# Patient Record
Sex: MALE | Race: WHITE | Employment: OTHER | URBAN - METROPOLITAN AREA
[De-identification: names, ages, dates, MRNs, and addresses within clinical notes are randomized per-mention and may not be internally consistent; named-entity substitution may affect disease eponyms.]

---

## 2020-11-27 ENCOUNTER — TRANSCRIBE ORDER (OUTPATIENT)
Dept: SCHEDULING | Age: 80
End: 2020-11-27

## 2020-11-27 DIAGNOSIS — I71.9 ANEURYSM OF AORTA (HCC): Primary | ICD-10-CM

## 2020-11-30 ENCOUNTER — TRANSCRIBE ORDER (OUTPATIENT)
Dept: SCHEDULING | Age: 80
End: 2020-11-30

## 2020-11-30 DIAGNOSIS — R42 DIZZINESS: Primary | ICD-10-CM

## 2020-11-30 DIAGNOSIS — I35.9 AORTIC VALVE DISORDER: Primary | ICD-10-CM

## 2020-11-30 DIAGNOSIS — R09.89 BRUIT: Primary | ICD-10-CM

## 2020-12-09 ENCOUNTER — HOSPITAL ENCOUNTER (OUTPATIENT)
Dept: VASCULAR SURGERY | Age: 80
Discharge: HOME | End: 2020-12-09
Attending: NURSE PRACTITIONER
Payer: MEDICARE

## 2020-12-09 VITALS — WEIGHT: 170 LBS | BODY MASS INDEX: 24.34 KG/M2 | HEIGHT: 70 IN

## 2020-12-09 DIAGNOSIS — R09.89 BRUIT: ICD-10-CM

## 2020-12-09 DIAGNOSIS — I35.9 AORTIC VALVE DISORDER: ICD-10-CM

## 2020-12-09 LAB
ECHO AO ROOT DIAM: 3.56 CM
ECHO AV AREA PEAK VELOCITY: 1.26 CM2
ECHO AV AREA VTI: 1.36 CM2
ECHO AV AREA/BSA PEAK VELOCITY: 0.6 CM2/M2
ECHO AV AREA/BSA VTI: 0.7 CM2/M2
ECHO AV MEAN GRADIENT: 11.62 MMHG
ECHO AV MEAN VELOCITY: 159.33 CM/S
ECHO AV PEAK GRADIENT: 21.5 MMHG
ECHO AV PEAK VELOCITY: 231.83 CM/S
ECHO AV REGURGITANT PHT: 436.18 MS
ECHO AV VTI: 50.36 CM
ECHO EST RA PRESSURE: 3 MMHG
ECHO LA VOL 2C: 38.9 ML (ref 18–58)
ECHO LA VOL 4C: 51.22 ML (ref 18–58)
ECHO LA VOL BP: 51.94 ML (ref 18–58)
ECHO LA VOL/BSA BIPLANE: 26.66 ML/M2 (ref 16–28)
ECHO LA VOLUME INDEX A2C: 19.97 ML/M2 (ref 16–28)
ECHO LA VOLUME INDEX A4C: 26.29 ML/M2 (ref 16–28)
ECHO LV E' LATERAL VELOCITY: 50 CM/S
ECHO LV EDV A2C: 103.32 ML
ECHO LV EDV NDEX A2C: 53 ML/M2
ECHO LV EJECTION FRACTION A2C: 71 PERCENT
ECHO LV EJECTION FRACTION A4C: 62 PERCENT
ECHO LV EJECTION FRACTION BIPLANE: 66.5 PERCENT (ref 55–100)
ECHO LV ESV A2C: 18.46 ML
ECHO LV ESV INDEX A2C: 9.5 ML/M2
ECHO LV INTERNAL DIMENSION DIASTOLIC: 4.72 CM (ref 4.2–5.9)
ECHO LV INTERNAL DIMENSION SYSTOLIC: 2.87 CM
ECHO LV IVSD: 1.32 CM (ref 0.6–1)
ECHO LV MASS 2D: 240.8 G (ref 88–224)
ECHO LV MASS INDEX 2D: 123.6 G/M2 (ref 49–115)
ECHO LV POSTERIOR WALL DIASTOLIC: 1.29 CM (ref 0.6–1)
ECHO LVOT DIAM: 2.01 CM
ECHO LVOT PEAK GRADIENT: 3.37 MMHG
ECHO LVOT PEAK VELOCITY: 91.73 CM/S
ECHO LVOT SV: 81.4 ML
ECHO LVOT SV: 81.4 ML
ECHO LVOT VTI: 21.61 CM
ECHO MV A VELOCITY: 86.45 CM/S
ECHO MV AREA PHT: 3.59 CM2
ECHO MV E DECELERATION TIME (DT): 211.2 MS
ECHO MV E VELOCITY: 98.78 CM/S
ECHO MV E/A RATIO: 1.14
ECHO MV E/E' LATERAL: 1.98
ECHO MV PRESSURE HALF TIME (PHT): 61.25 MS
ECHO RA AREA 4C: 14 CM2
ECHO RIGHT VENTRICULAR SYSTOLIC PRESSURE (RVSP): 30 MMHG
ECHO RV INTERNAL DIMENSION: 3.04 CM
ECHO TV REGURGITANT MAX VELOCITY: 261.68 CM/S
ECHO TV REGURGITANT PEAK GRADIENT: 27.39 MMHG
LEFT CCA DIST DIAS: 9 CM/S
LEFT CCA DIST SYS: 96 CM/S
LEFT CCA PROX DIAS: 15 CM/S
LEFT CCA PROX SYS: 118 CM/S
LEFT ECA SYS: 84 CM/S
LEFT ICA DIST DIAS: 12 CM/S
LEFT ICA DIST SYS: 69 CM/S
LEFT ICA MID DIAS: 11 CM/S
LEFT ICA MID SYS: 83 CM/S
LEFT ICA PROX DIAS: 9 CM/S
LEFT ICA PROX SYS: 86 CM/S
LEFT ICA/CCA SYS: 0.89
LEFT SUBCLAVIAN SYS: 110 CM/S
LEFT VERTEBRAL SYS: 50 CM/S
LVOT MG: 1.89 MMHG
RIGHT CCA DIST DIAS: 6 CM/S
RIGHT CCA DIST SYS: 78 CM/S
RIGHT CCA PROX DIAS: 0 CM/S
RIGHT CCA PROX SYS: 81 CM/S
RIGHT ECA SYS: 106 CM/S
RIGHT ICA DIST DIAS: 9 CM/S
RIGHT ICA DIST SYS: 88 CM/S
RIGHT ICA MID DIAS: 11 CM/S
RIGHT ICA MID SYS: 80 CM/S
RIGHT ICA PROX DIAS: 11 CM/S
RIGHT ICA PROX SYS: 80 CM/S
RIGHT ICA/CCA SYS: 1.1
RIGHT SUBCLAVIAN SYS: 100 CM/S
RIGHT VERTEBRAL SYS: 51 CM/S

## 2020-12-09 PROCEDURE — 93880 EXTRACRANIAL BILAT STUDY: CPT

## 2020-12-09 PROCEDURE — 93306 TTE W/DOPPLER COMPLETE: CPT

## 2021-03-04 ENCOUNTER — HOSPITAL ENCOUNTER (OUTPATIENT)
Age: 81
Setting detail: OBSERVATION
Discharge: HOME | End: 2021-03-05
Attending: EMERGENCY MEDICINE | Admitting: INTERNAL MEDICINE
Payer: MEDICARE

## 2021-03-04 ENCOUNTER — APPOINTMENT (OUTPATIENT)
Dept: VASCULAR SURGERY | Age: 81
End: 2021-03-04
Attending: INTERNAL MEDICINE
Payer: MEDICARE

## 2021-03-04 ENCOUNTER — APPOINTMENT (OUTPATIENT)
Dept: GENERAL RADIOLOGY | Age: 81
End: 2021-03-04
Attending: EMERGENCY MEDICINE
Payer: MEDICARE

## 2021-03-04 DIAGNOSIS — I20.9 ANGINA PECTORIS (HCC): Primary | ICD-10-CM

## 2021-03-04 PROBLEM — R07.9 CHEST PAIN: Status: ACTIVE | Noted: 2021-03-04

## 2021-03-04 LAB
ALBUMIN SERPL-MCNC: 3.5 G/DL (ref 3.5–5)
ALBUMIN/GLOB SERPL: 0.8 {RATIO} (ref 1.1–2.2)
ALP SERPL-CCNC: 125 U/L (ref 45–117)
ALT SERPL-CCNC: 13 U/L (ref 12–78)
ANION GAP SERPL CALC-SCNC: 7 MMOL/L (ref 5–15)
AST SERPL W P-5'-P-CCNC: 15 U/L (ref 15–37)
ATRIAL RATE: 61 BPM
BASOPHILS # BLD: 0 K/UL (ref 0–0.2)
BASOPHILS NFR BLD: 0 % (ref 0–2.5)
BILIRUB SERPL-MCNC: 0.4 MG/DL (ref 0.2–1)
BUN SERPL-MCNC: 25 MG/DL (ref 6–20)
BUN/CREAT SERPL: 16 (ref 12–20)
CA-I BLD-MCNC: 9.2 MG/DL (ref 8.5–10.1)
CALCULATED P AXIS, ECG09: 33 DEGREES
CALCULATED R AXIS, ECG10: 58 DEGREES
CALCULATED T AXIS, ECG11: 59 DEGREES
CHLORIDE SERPL-SCNC: 106 MMOL/L (ref 97–108)
CK SERPL-CCNC: 40 U/L (ref 39–308)
CO2 SERPL-SCNC: 27 MMOL/L (ref 21–32)
CREAT SERPL-MCNC: 1.61 MG/DL (ref 0.7–1.3)
DIAGNOSIS, 93000: NORMAL
ECHO LV EDV BP: 50.83 ML (ref 67–155)
ECHO LV EDV BP: 50.83 ML (ref 67–155)
ECHO LV EJECTION FRACTION A2C: 66 PERCENT
ECHO LV EJECTION FRACTION A4C: 60 PERCENT
ECHO LV EJECTION FRACTION BIPLANE: 65.6 PERCENT (ref 55–100)
ECHO LV ESV A2C: 13.11 ML
ECHO LV ESV BP: 17.47 ML (ref 22–58)
ECHO LV ESV BP: 17.47 ML (ref 22–58)
ECHO LV ESV INDEX A2C: 6.7 ML/M2
EOSINOPHIL # BLD: 0.7 K/UL (ref 0–0.7)
EOSINOPHIL NFR BLD: 8 % (ref 0.9–2.9)
ERYTHROCYTE [DISTWIDTH] IN BLOOD BY AUTOMATED COUNT: 14.7 % (ref 11.5–14.5)
GLOBULIN SER CALC-MCNC: 4.3 G/DL (ref 2–4)
GLUCOSE SERPL-MCNC: 101 MG/DL (ref 65–100)
HCT VFR BLD AUTO: 44.2 % (ref 41–53)
HGB BLD-MCNC: 14.8 G/DL (ref 13.5–17.5)
LYMPHOCYTES # BLD: 2.2 K/UL (ref 1–4.8)
LYMPHOCYTES NFR BLD: 27 % (ref 20.5–51.1)
MCH RBC QN AUTO: 30.5 PG (ref 31–34)
MCHC RBC AUTO-ENTMCNC: 33.4 G/DL (ref 31–36)
MCV RBC AUTO: 91.2 FL (ref 80–100)
MONOCYTES # BLD: 0.8 K/UL (ref 0.2–2.4)
MONOCYTES NFR BLD: 10 % (ref 1.7–9.3)
NEUTS SEG # BLD: 4.6 K/UL (ref 1.8–7.7)
NEUTS SEG NFR BLD: 55 % (ref 42–75)
NRBC # BLD: 0.01 K/UL
NRBC BLD-RTO: 0.1 PER 100 WBC
P-R INTERVAL, ECG05: 231 MS
PLATELET # BLD AUTO: 208 K/UL
PMV BLD AUTO: 8.4 FL (ref 6.5–11.5)
POTASSIUM SERPL-SCNC: 3.8 MMOL/L (ref 3.5–5.1)
PROT SERPL-MCNC: 7.8 G/DL (ref 6.4–8.2)
Q-T INTERVAL, ECG07: 462 MS
QRS DURATION, ECG06: 93 MS
QTC CALCULATION (BEZET), ECG08: 466 MS
RBC # BLD AUTO: 4.85 M/UL (ref 4.5–5.9)
SODIUM SERPL-SCNC: 140 MMOL/L (ref 136–145)
TROPONIN I SERPL-MCNC: 0.05 NG/ML
TROPONIN I SERPL-MCNC: 0.06 NG/ML
TROPONIN I SERPL-MCNC: 0.06 NG/ML
TROPONIN I SERPL-MCNC: 0.07 NG/ML
VENTRICULAR RATE, ECG03: 61 BPM
WBC # BLD AUTO: 8.3 K/UL (ref 4.4–11.3)

## 2021-03-04 PROCEDURE — 74011250637 HC RX REV CODE- 250/637: Performed by: NURSE PRACTITIONER

## 2021-03-04 PROCEDURE — 71046 X-RAY EXAM CHEST 2 VIEWS: CPT

## 2021-03-04 PROCEDURE — 99285 EMERGENCY DEPT VISIT HI MDM: CPT

## 2021-03-04 PROCEDURE — 99218 HC RM OBSERVATION: CPT

## 2021-03-04 PROCEDURE — 84484 ASSAY OF TROPONIN QUANT: CPT

## 2021-03-04 PROCEDURE — 93308 TTE F-UP OR LMTD: CPT

## 2021-03-04 PROCEDURE — 96372 THER/PROPH/DIAG INJ SC/IM: CPT

## 2021-03-04 PROCEDURE — 74011250637 HC RX REV CODE- 250/637: Performed by: EMERGENCY MEDICINE

## 2021-03-04 PROCEDURE — 80053 COMPREHEN METABOLIC PANEL: CPT

## 2021-03-04 PROCEDURE — 93005 ELECTROCARDIOGRAM TRACING: CPT

## 2021-03-04 PROCEDURE — 85025 COMPLETE CBC W/AUTO DIFF WBC: CPT

## 2021-03-04 PROCEDURE — 74011250636 HC RX REV CODE- 250/636: Performed by: INTERNAL MEDICINE

## 2021-03-04 PROCEDURE — 82550 ASSAY OF CK (CPK): CPT

## 2021-03-04 PROCEDURE — 74011250637 HC RX REV CODE- 250/637: Performed by: INTERNAL MEDICINE

## 2021-03-04 PROCEDURE — 36415 COLL VENOUS BLD VENIPUNCTURE: CPT

## 2021-03-04 RX ORDER — CALCIUM CARB/MAGNESIUM CARB 311-232MG
5 TABLET ORAL
Status: DISCONTINUED | OUTPATIENT
Start: 2021-03-04 | End: 2021-03-05 | Stop reason: HOSPADM

## 2021-03-04 RX ORDER — GUAIFENESIN 100 MG/5ML
81 LIQUID (ML) ORAL
Status: COMPLETED | OUTPATIENT
Start: 2021-03-04 | End: 2021-03-04

## 2021-03-04 RX ORDER — ASPIRIN 81 MG/1
TABLET ORAL DAILY
COMMUNITY

## 2021-03-04 RX ORDER — PROMETHAZINE HYDROCHLORIDE 25 MG/1
12.5 TABLET ORAL
Status: DISCONTINUED | OUTPATIENT
Start: 2021-03-04 | End: 2021-03-05 | Stop reason: HOSPADM

## 2021-03-04 RX ORDER — CLOPIDOGREL BISULFATE 75 MG/1
75 TABLET ORAL
Status: COMPLETED | OUTPATIENT
Start: 2021-03-04 | End: 2021-03-04

## 2021-03-04 RX ORDER — ASPIRIN 81 MG/1
81 TABLET ORAL DAILY
Status: DISCONTINUED | OUTPATIENT
Start: 2021-03-05 | End: 2021-03-05 | Stop reason: HOSPADM

## 2021-03-04 RX ORDER — ACETAMINOPHEN 650 MG/1
650 SUPPOSITORY RECTAL
Status: DISCONTINUED | OUTPATIENT
Start: 2021-03-04 | End: 2021-03-05 | Stop reason: HOSPADM

## 2021-03-04 RX ORDER — CLOPIDOGREL BISULFATE 75 MG/1
75 TABLET ORAL DAILY
Status: DISCONTINUED | OUTPATIENT
Start: 2021-03-05 | End: 2021-03-05 | Stop reason: HOSPADM

## 2021-03-04 RX ORDER — AMLODIPINE BESYLATE 5 MG/1
5 TABLET ORAL DAILY
COMMUNITY
End: 2022-03-16

## 2021-03-04 RX ORDER — ACETAMINOPHEN 325 MG/1
650 TABLET ORAL
Status: DISCONTINUED | OUTPATIENT
Start: 2021-03-04 | End: 2021-03-05 | Stop reason: HOSPADM

## 2021-03-04 RX ORDER — HEPARIN SODIUM 5000 [USP'U]/ML
5000 INJECTION, SOLUTION INTRAVENOUS; SUBCUTANEOUS EVERY 12 HOURS
Status: DISCONTINUED | OUTPATIENT
Start: 2021-03-04 | End: 2021-03-05 | Stop reason: HOSPADM

## 2021-03-04 RX ORDER — ATORVASTATIN CALCIUM 40 MG/1
40 TABLET, FILM COATED ORAL DAILY
Status: DISCONTINUED | OUTPATIENT
Start: 2021-03-05 | End: 2021-03-05 | Stop reason: HOSPADM

## 2021-03-04 RX ORDER — ONDANSETRON 2 MG/ML
4 INJECTION INTRAMUSCULAR; INTRAVENOUS
Status: DISCONTINUED | OUTPATIENT
Start: 2021-03-04 | End: 2021-03-05 | Stop reason: HOSPADM

## 2021-03-04 RX ORDER — SODIUM CHLORIDE 0.9 % (FLUSH) 0.9 %
5-40 SYRINGE (ML) INJECTION EVERY 8 HOURS
Status: DISCONTINUED | OUTPATIENT
Start: 2021-03-04 | End: 2021-03-05 | Stop reason: HOSPADM

## 2021-03-04 RX ORDER — AMLODIPINE BESYLATE 5 MG/1
5 TABLET ORAL DAILY
Status: DISCONTINUED | OUTPATIENT
Start: 2021-03-05 | End: 2021-03-05 | Stop reason: HOSPADM

## 2021-03-04 RX ORDER — POLYETHYLENE GLYCOL 3350 17 G/17G
17 POWDER, FOR SOLUTION ORAL DAILY PRN
Status: DISCONTINUED | OUTPATIENT
Start: 2021-03-04 | End: 2021-03-05 | Stop reason: HOSPADM

## 2021-03-04 RX ORDER — CLOPIDOGREL BISULFATE 75 MG/1
75 TABLET ORAL DAILY
COMMUNITY

## 2021-03-04 RX ORDER — SODIUM CHLORIDE 0.9 % (FLUSH) 0.9 %
5-40 SYRINGE (ML) INJECTION AS NEEDED
Status: DISCONTINUED | OUTPATIENT
Start: 2021-03-04 | End: 2021-03-05 | Stop reason: HOSPADM

## 2021-03-04 RX ORDER — NITROGLYCERIN 0.4 MG/1
0.4 TABLET SUBLINGUAL AS NEEDED
Status: DISCONTINUED | OUTPATIENT
Start: 2021-03-04 | End: 2021-03-05

## 2021-03-04 RX ORDER — ATORVASTATIN CALCIUM 40 MG/1
40 TABLET, FILM COATED ORAL DAILY
COMMUNITY

## 2021-03-04 RX ORDER — PANTOPRAZOLE SODIUM 40 MG/1
40 TABLET, DELAYED RELEASE ORAL
Status: DISCONTINUED | OUTPATIENT
Start: 2021-03-05 | End: 2021-03-05 | Stop reason: HOSPADM

## 2021-03-04 RX ADMIN — ACETAMINOPHEN 650 MG: 325 TABLET ORAL at 12:40

## 2021-03-04 RX ADMIN — Medication 10 ML: at 14:23

## 2021-03-04 RX ADMIN — Medication 10 ML: at 21:55

## 2021-03-04 RX ADMIN — Medication 5 MG: at 21:55

## 2021-03-04 RX ADMIN — ASPIRIN 81 MG: 81 TABLET, CHEWABLE ORAL at 08:50

## 2021-03-04 RX ADMIN — CLOPIDOGREL BISULFATE 75 MG: 75 TABLET ORAL at 08:50

## 2021-03-04 RX ADMIN — HEPARIN SODIUM 5000 UNITS: 5000 INJECTION INTRAVENOUS; SUBCUTANEOUS at 12:38

## 2021-03-04 RX ADMIN — NITROGLYCERIN 0.4 MG: 0.4 TABLET, ORALLY DISINTEGRATING SUBLINGUAL at 09:55

## 2021-03-04 NOTE — Clinical Note
Patient Class[de-identified] OBSERVATION [104]   Type of Bed: Telemetry [19]   Reason for Observation: trend cardiac biomarkers, NTG PRN   Admitting Diagnosis: Angina pectoris [facility] [[id number]]   Admitting Physician: Vida Payan [[id number]]   Attending Physician: Vida Payan [[id number]]

## 2021-03-04 NOTE — PROGRESS NOTES
Problem: Falls - Risk of  Goal: *Absence of Falls  Description: Document Zandra Suero Fall Risk and appropriate interventions in the flowsheet.   Outcome: Progressing Towards Goal  Note: Fall Risk Interventions:            Medication Interventions: Patient to call before getting OOB                   Problem: Patient Education: Go to Patient Education Activity  Goal: Patient/Family Education  Outcome: Progressing Towards Goal

## 2021-03-04 NOTE — ED TRIAGE NOTES
Pt reports chest pain that began at 3 am. Pt reports slight SOB with exertion. Pt has extensive cardiac history. Pt describes pain as more of a tightness.

## 2021-03-04 NOTE — ED NOTES
Pt reports PRN nitro given at 0955 has helped with the tightness in his chest. Pt has no other complaints or needs at this time. Pt is resting with fiancee at bedside.

## 2021-03-04 NOTE — ED NOTES
House supervisor Nguyen Badillo called stating IP bed 206  will be assigned shortly once hospitalist orders are finalized.

## 2021-03-04 NOTE — ED PROVIDER NOTES
HPI   Patient reports spontaneous onset of dull and aching left substernal chest pain overnight while at rest.  Patient also reports an indigestion sensation with so he took an antacid without relief. Pain worsened with walking and was relieved somewhat by rest.  Pain continued until approximately 1 hour ago prompting patient's presentation. Patient reports that it is still present however much diminished. Patient has a history of pulmonary fibrosis and dyspnea on exertion. This is unchanged. He denies nausea, diaphoresis, new shortness of breath as noted. Patient has an extensive cardiac history including NSTEMI with stent placement but cannot recall if the symptoms today are similar to those. No GI or  complaints. Past Medical History:   Diagnosis Date    Aortic stenosis     Arthritis     Chronic pain     LOWER BACK    GERD (gastroesophageal reflux disease)     Hypertension     Other ill-defined conditions(799.89)     HIGH CHOLESTEROL    Pulmonary fibrosis ([address]     Stroke ([address]     TIA       Past Surgical History:   Procedure Laterality Date    HX GI      COLONOSCOPY    HX HEENT  1997    NOSE FX     HX LUMBAR DISKECTOMY  1970'S    X2    HX ORTHOPAEDIC  2003    LT ANKLE FX W/ SCREWS AND PLATE    HX ORTHOPAEDIC  2009    MAYA.  CARPAL TUNNEL    HX VASCULAR STENT  2019         Family History:   Problem Relation Age of Onset    Stroke Mother     Cancer Father         LEUKEMIA    Cancer Sister         BRAIN TUMOR    COPD Sister     Cancer Sister         KIDNEY       Social History     Socioeconomic History    Marital status:      Spouse name: Not on file    Number of children: Not on file    Years of education: Not on file    Highest education level: Not on file   Occupational History    Not on file   Social Needs    Financial resource strain: Not on file    Food insecurity     Worry: Not on file     Inability: Not on file    Transportation needs     Medical: Not on file Non-medical: Not on file   Tobacco Use    Smoking status: Former Smoker     Years: 40.00    Smokeless tobacco: Never Used   Substance and Sexual Activity    Alcohol use: Yes     Comment: Weekends    Drug use: No    Sexual activity: Not on file   Lifestyle    Physical activity     Days per week: Not on file     Minutes per session: Not on file    Stress: Not on file   Relationships    Social connections     Talks on phone: Not on file     Gets together: Not on file     Attends Restorationism service: Not on file     Active member of club or organization: Not on file     Attends meetings of clubs or organizations: Not on file     Relationship status: Not on file    Intimate partner violence     Fear of current or ex partner: Not on file     Emotionally abused: Not on file     Physically abused: Not on file     Forced sexual activity: Not on file   Other Topics Concern    Not on file   Social History Narrative    Not on file         ALLERGIES: Patient has no known allergies. Review of Systems   Constitutional: Negative. HENT: Negative. Eyes: Negative. Respiratory: Positive for chest tightness and shortness of breath. Cardiovascular: Positive for chest pain. Gastrointestinal: Negative. Endocrine: Negative. Genitourinary: Negative. Musculoskeletal: Negative. Allergic/Immunologic: Negative. Neurological: Negative. Hematological: Negative. Psychiatric/Behavioral: Negative. All other systems reviewed and are negative. Vitals:    03/04/21 0817 03/04/21 0825 03/04/21 0828   BP: (!) 163/79     Pulse: 61     Resp: 20     Temp:   97.9 °F (36.6 °C)   SpO2: 96% 96%    Weight: 77.1 kg (170 lb)     Height: 5' 10\" (1.778 m)              Physical Exam  Vitals signs and nursing note reviewed. Constitutional:       General: He is not in acute distress. Appearance: Normal appearance. He is normal weight. He is not ill-appearing, toxic-appearing or diaphoretic.    HENT:      Head: Normocephalic and atraumatic.      Nose: Nose normal.      Mouth/Throat:      Mouth: Mucous membranes are moist.   Eyes:      Extraocular Movements: Extraocular movements intact.      Pupils: Pupils are equal, round, and reactive to light.   Cardiovascular:      Rate and Rhythm: Normal rate and regular rhythm.      Pulses: Normal pulses.      Heart sounds: Murmur present.      Comments: 3/6 systolic ejection murmur  Pulmonary:      Effort: Pulmonary effort is normal. No respiratory distress.      Breath sounds: No stridor. Rales present. No wheezing or rhonchi.      Comments: Extensive rales worse at bases.  No distress, no other abnormal breath sounds  Chest:      Chest wall: No tenderness.   Abdominal:      General: Abdomen is flat. There is no distension.      Palpations: Abdomen is soft.      Tenderness: There is no abdominal tenderness.   Musculoskeletal: Normal range of motion.         General: No swelling.      Right lower leg: No edema.      Left lower leg: No edema.   Skin:     General: Skin is warm and dry.      Coloration: Skin is not pale.   Neurological:      General: No focal deficit present.      Mental Status: He is alert and oriented to person, place, and time. Mental status is at baseline.      Cranial Nerves: No cranial nerve deficit.      Motor: No weakness.   Psychiatric:         Mood and Affect: Mood normal.         Behavior: Behavior normal.          MDM     History and physical are concerning for ischemia.  EKG is nondiagnostic, however this certainly could be an STEMI.  Patient declined nitro.  Have dosed aspirin and Plavix.  I suspect patient will be admitted.    EKG interpreted by me: Normal sinus rhythm at 61 bpm; first-degree AV block; normal axis; normal T waves; normal ST segments.    Patient reports return of chest tightness.  He has previously declined nitro several times but is amenable to it now.  Initial troponin is 0.06.  Discussed with patient and family that given his ongoing  pain and a nonnegative troponin, I recommend commencement of heparin drip and transfer for likely intervention. Family requests that we wait for second troponin.     Procedures

## 2021-03-04 NOTE — H&P
History & Physical    Primary Care Provider: Polina Newberry MD  Source of Information: Patient     History of Presenting Illness:   Nikki Prasad is a 68 y.o. male who presents with complaints of left sided chest pain described as dull without radiation but associated with shortness of breath. Pain started while patient was laying in bed at about 3:30 AM.  No nausea or vomiting. No cough or Productive sputum. Pain dissipated with sublingual nitroglycerin. By the time patient was examined, he was completely chest pain-free. Twelve-lead EKG shows nonspecific ST-T wave changes and 2 sets of troponin are unremarkable. Will be monitored overnight in the hospital       Review of Systems:  A comprehensive review of systems was negative except for that written in the History of Present Illness. Past Medical History:   Diagnosis Date    Aortic stenosis     Arthritis     Chronic pain     LOWER BACK    GERD (gastroesophageal reflux disease)     Hypertension     Other ill-defined conditions(799.89)     HIGH CHOLESTEROL    Pulmonary fibrosis ([address]     Stroke ([address]     TIA      Past Surgical History:   Procedure Laterality Date    HX GI      COLONOSCOPY    HX HEENT  1997    NOSE FX     HX LUMBAR DISKECTOMY  1970'S    X2    HX ORTHOPAEDIC  2003    LT ANKLE FX W/ SCREWS AND PLATE    HX ORTHOPAEDIC  2009    MAYA. CARPAL TUNNEL    HX VASCULAR STENT  2019     Prior to Admission medications    Medication Sig Start Date End Date Taking? Authorizing Provider   aspirin delayed-release 81 mg tablet Take  by mouth daily. Yes Other, MD Suki   clopidogreL (PLAVIX) 75 mg tab Take  by mouth. Yes Other, MD Suki   atorvastatin (LIPITOR) 40 mg tablet Take  by mouth daily. Yes Other, MD Suki   amLODIPine (NORVASC) 5 mg tablet Take 5 mg by mouth daily. Yes Other, MD Suki   Omeprazole delayed release (PRILOSEC D/R) 20 mg tablet Take 20 mg by mouth daily.       Provider, Historical     No Known Allergies   Family History   Problem Relation Age of Onset    Stroke Mother     Cancer Father         LEUKEMIA    Cancer Sister         BRAIN TUMOR    COPD Sister     Cancer Sister         KIDNEY        SOCIAL HISTORY:  Patient resides:  Independently    Assisted Living    SNF    With family care x      Smoking history:   None    Former x   Chronic      Alcohol history:   None    Social x   Chronic      Ambulates:   Independently x   w/cane    w/walker    w/wc    CODE STATUS:  DNR    Full x   Other      Objective:     Physical Exam:     Visit Vitals  BP (!) 163/78   Pulse (!) 54   Temp 97.9 °F (36.6 °C)   Resp 16   Ht 5' 10\" (1.778 m)   Wt 77.1 kg (170 lb)   SpO2 96%   BMI 24.39 kg/m²      O2 Device: Room air    General:  Alert, cooperative, no distress, appears stated age. Head:  Normocephalic, without obvious abnormality, atraumatic. Eyes:  Conjunctivae/corneas clear. PERRL, EOMs intact. Nose: Nares normal. Septum midline. Mucosa normal. No drainage or sinus tenderness. Throat: Lips, mucosa, and tongue normal. Teeth and gums normal.   Neck: Supple, symmetrical, trachea midline, no adenopathy, thyroid: no enlargement/tenderness/nodules, no carotid bruit and no JVD. Back:   Symmetric, no curvature. ROM normal. No CVA tenderness. Lungs:   Clear to auscultation bilaterally. Chest wall:  No tenderness or deformity. Heart:  Regular rate and rhythm, S1, S2 normal, no murmur, click, rub or gallop. Abdomen:   Soft, non-tender. Bowel sounds normal. No masses,  No organomegaly. Extremities: Extremities normal, atraumatic, no cyanosis or edema. Pulses: 2+ and symmetric all extremities. Skin: Skin color, texture, turgor normal. No rashes or lesions   Neurologic: CNII-XII intact. No motor or sensory deficits. EKG:  normal EKG, normal sinus rhythm, unchanged from previous tracings, nonspecific ST and T waves changes.       Data Review:     Recent Days:  Recent Labs 03/04/21  0840   WBC 8.3   HGB 14.8   HCT 44.2        Recent Labs     03/04/21  0840      K 3.8      CO2 27   *   BUN 25*   CREA 1.61*   CA 9.2   ALB 3.5   TBILI 0.4   ALT 13     No results for input(s): PH, PCO2, PO2, HCO3, FIO2 in the last 72 hours. 24 Hour Results:  Recent Results (from the past 24 hour(s))   EKG, 12 LEAD, INITIAL    Collection Time: 03/04/21  8:19 AM   Result Value Ref Range    Ventricular Rate 61 BPM    Atrial Rate 61 BPM    P-R Interval 231 ms    QRS Duration 93 ms    Q-T Interval 462 ms    QTC Calculation (Bezet) 466 ms    Calculated P Axis 33 degrees    Calculated R Axis 58 degrees    Calculated T Axis 59 degrees    Diagnosis       Sinus rhythm  Prolonged NV interval  Anteroseptal infarct, age indeterminate    Confirmed by [facility] ([id number][facility] on 3/4/2021 10:03:38 AM     CBC WITH AUTOMATED DIFF    Collection Time: 03/04/21  8:40 AM   Result Value Ref Range    WBC 8.3 4.4 - 11.3 K/uL    RBC 4.85 4.50 - 5.90 M/uL    HGB 14.8 13.5 - 17.5 g/dL    HCT 44.2 41 - 53 %    MCV 91.2 80 - 100 FL    MCH 30.5 (L) 31 - 34 PG    MCHC 33.4 31.0 - 36.0 g/dL    RDW 14.7 (H) 11.5 - 14.5 %    PLATELET 987 K/uL    MPV 8.4 6.5 - 11.5 FL    NRBC 0.1  WBC    ABSOLUTE NRBC 0.01 K/uL    NEUTROPHILS 55 42 - 75 %    LYMPHOCYTES 27 20.5 - 51.1 %    MONOCYTES 10 (H) 1.7 - 9.3 %    EOSINOPHILS 8 (H) 0.9 - 2.9 %    BASOPHILS 0 0.0 - 2.5 %    ABS. NEUTROPHILS 4.6 1.8 - 7.7 K/UL    ABS. LYMPHOCYTES 2.2 1.0 - 4.8 K/UL    ABS. MONOCYTES 0.8 0.2 - 2.4 K/UL    ABS. EOSINOPHILS 0.7 0.0 - 0.7 K/UL    ABS.  BASOPHILS 0.0 0.0 - 0.2 K/UL   METABOLIC PANEL, COMPREHENSIVE    Collection Time: 03/04/21  8:40 AM   Result Value Ref Range    Sodium 140 136 - 145 mmol/L    Potassium 3.8 3.5 - 5.1 mmol/L    Chloride 106 97 - 108 mmol/L    CO2 27 21 - 32 mmol/L    Anion gap 7 5 - 15 mmol/L    Glucose 101 (H) 65 - 100 mg/dL    BUN 25 (H) 6 - 20 mg/dL    Creatinine 1.61 (H) 0.70 - 1.30 mg/dL BUN/Creatinine ratio 16 12 - 20      GFR est AA 51 (L) >60 ml/min/1.73m2    GFR est non-AA 42 (L) >60 ml/min/1.73m2    Calcium 9.2 8.5 - 10.1 mg/dL    Bilirubin, total 0.4 0.2 - 1.0 mg/dL    AST (SGOT) 15 15 - 37 U/L    ALT (SGPT) 13 12 - 78 U/L    Alk.  phosphatase 125 (H) 45 - 117 U/L    Protein, total 7.8 6.4 - 8.2 g/dL    Albumin 3.5 3.5 - 5.0 g/dL    Globulin 4.3 (H) 2.0 - 4.0 g/dL    A-G Ratio 0.8 (L) 1.1 - 2.2     CK    Collection Time: 03/04/21  8:40 AM   Result Value Ref Range    CK 40 39 - 308 U/L   TROPONIN I    Collection Time: 03/04/21  8:40 AM   Result Value Ref Range    Troponin-I, Qt. 0.06 (H) <0.05 ng/mL   TROPONIN I    Collection Time: 03/04/21 10:45 AM   Result Value Ref Range    Troponin-I, Qt. 0.05 (H) <0.05 ng/mL         Imaging:   XR CHEST PA LAT   Final Result            Assessment:     Atypical chest pain    -Rule out acute coronary syndrome    -Obtain 2 Sets of cardiac enzymes every 3 hours    -Resume home dose aspirin, statin and Plavix    -Obtain limited 2D echocardiogram.  2D echocardiogram obtained in December showed preserved ejection fraction    Coronary artery disease status post stent    -Fairly stable    -If recurrence of chest pain symptoms, patient may benefit from cardiac catheterization    Hyperlipidemia    -Resume home dose atorvastatin    Gastroesophageal reflux disease    -Resume Protonix 40 mg oral daily      Plan:   Admit to observation telemetry  Plan as above  CODE STATUS discussed with wife daughter and patient  Patient is a full code  Anticipate overnight hospital stay with plan discharge for tomorrow      Signed By: Damir Bates MD     March 4, 2021

## 2021-03-04 NOTE — ED NOTES
Blood taken to lab. Pt refused ordered nitro. Pt accepted aspirin and plavix. Pt has no other needs at this time. Fiance at bedside.

## 2021-03-05 VITALS
DIASTOLIC BLOOD PRESSURE: 60 MMHG | HEIGHT: 70 IN | RESPIRATION RATE: 19 BRPM | BODY MASS INDEX: 24.34 KG/M2 | TEMPERATURE: 97.7 F | SYSTOLIC BLOOD PRESSURE: 105 MMHG | WEIGHT: 170 LBS | OXYGEN SATURATION: 96 % | HEART RATE: 63 BPM

## 2021-03-05 LAB
ANION GAP SERPL CALC-SCNC: 9 MMOL/L (ref 5–15)
BUN SERPL-MCNC: 25 MG/DL (ref 6–20)
BUN/CREAT SERPL: 15 (ref 12–20)
CA-I BLD-MCNC: 9 MG/DL (ref 8.5–10.1)
CHLORIDE SERPL-SCNC: 108 MMOL/L (ref 97–108)
CO2 SERPL-SCNC: 23 MMOL/L (ref 21–32)
CREAT SERPL-MCNC: 1.63 MG/DL (ref 0.7–1.3)
ERYTHROCYTE [DISTWIDTH] IN BLOOD BY AUTOMATED COUNT: 15.1 % (ref 11.5–14.5)
GLUCOSE SERPL-MCNC: 101 MG/DL (ref 65–100)
HCT VFR BLD AUTO: 42.4 % (ref 41–53)
HGB BLD-MCNC: 14 G/DL (ref 13.5–17.5)
MCH RBC QN AUTO: 30.3 PG (ref 31–34)
MCHC RBC AUTO-ENTMCNC: 32.9 G/DL (ref 31–36)
MCV RBC AUTO: 91.9 FL (ref 80–100)
NRBC # BLD: 0.01 K/UL
NRBC BLD-RTO: 0.1 PER 100 WBC
PLATELET # BLD AUTO: 204 K/UL
PMV BLD AUTO: 8.8 FL (ref 6.5–11.5)
POTASSIUM SERPL-SCNC: 4.2 MMOL/L (ref 3.5–5.1)
RBC # BLD AUTO: 4.61 M/UL (ref 4.5–5.9)
SODIUM SERPL-SCNC: 140 MMOL/L (ref 136–145)
TROPONIN I SERPL-MCNC: 0.07 NG/ML
WBC # BLD AUTO: 7.5 K/UL (ref 4.4–11.3)

## 2021-03-05 PROCEDURE — 36415 COLL VENOUS BLD VENIPUNCTURE: CPT

## 2021-03-05 PROCEDURE — 84484 ASSAY OF TROPONIN QUANT: CPT

## 2021-03-05 PROCEDURE — 96372 THER/PROPH/DIAG INJ SC/IM: CPT

## 2021-03-05 PROCEDURE — 80048 BASIC METABOLIC PNL TOTAL CA: CPT

## 2021-03-05 PROCEDURE — 85027 COMPLETE CBC AUTOMATED: CPT

## 2021-03-05 PROCEDURE — 74011250637 HC RX REV CODE- 250/637: Performed by: INTERNAL MEDICINE

## 2021-03-05 PROCEDURE — 74011250636 HC RX REV CODE- 250/636: Performed by: INTERNAL MEDICINE

## 2021-03-05 PROCEDURE — 99218 HC RM OBSERVATION: CPT

## 2021-03-05 RX ORDER — NITROGLYCERIN 0.4 MG/1
0.4 TABLET SUBLINGUAL AS NEEDED
Status: DISCONTINUED | OUTPATIENT
Start: 2021-03-05 | End: 2021-03-05 | Stop reason: HOSPADM

## 2021-03-05 RX ORDER — NITROGLYCERIN 0.4 MG/1
0.4 TABLET SUBLINGUAL AS NEEDED
Qty: 15 TAB | Refills: 0 | Status: SHIPPED | OUTPATIENT
Start: 2021-03-05 | End: 2021-03-10

## 2021-03-05 RX ADMIN — ASPIRIN 81 MG: 81 TABLET, COATED ORAL at 07:59

## 2021-03-05 RX ADMIN — HEPARIN SODIUM 5000 UNITS: 5000 INJECTION INTRAVENOUS; SUBCUTANEOUS at 00:04

## 2021-03-05 RX ADMIN — AMLODIPINE BESYLATE 5 MG: 5 TABLET ORAL at 08:00

## 2021-03-05 RX ADMIN — PANTOPRAZOLE SODIUM 40 MG: 40 TABLET, DELAYED RELEASE ORAL at 07:59

## 2021-03-05 RX ADMIN — CLOPIDOGREL BISULFATE 75 MG: 75 TABLET ORAL at 07:58

## 2021-03-05 RX ADMIN — ATORVASTATIN CALCIUM 40 MG: 40 TABLET, FILM COATED ORAL at 07:59

## 2021-03-05 RX ADMIN — Medication 10 ML: at 05:51

## 2021-03-05 NOTE — DISCHARGE SUMMARY
Physician Discharge Summary     Patient ID:    Samson López  [id number]  30 y.o.  1940    Admit date: 3/4/2021    Discharge date : 3/5/2021    Chronic Diagnoses:    Problem List as of 3/5/2021 Never Reviewed          Codes Class Noted - Resolved    Angina pectoris ([facility] ICD-10-CM: I20.9  ICD-9-CM: 413.9  3/4/2021 - Present        Chest pain ICD-10-CM: R07.9  ICD-9-CM: 786.50  3/4/2021 - Present        Lumbar stenosis ICD-10-CM: M48.061  ICD-9-CM: 724.02  6/19/2012 - Present          22    Final Diagnoses:   Angina pectoris ([facility] [I20.9]  Chest pain [R07.9]    Reason for Hospitalization:  Chest pain        Hospital Course:     72-year-old pleasant gentleman with history of coronary artery disease status post stents admitted to the emergency department with reports of left-sided chest pain. Twelve-lead EKG showed nonspecific ST-T wave changes. Chest pain relieved by sublingual nitroglycerin. He has been chest pain-free overnight. No abnormal arrhythmias. CE Times x3 unremarkable. 2D echocardiogram with well-preserved ejection fraction. He will follow-up outpatient with cardiologist for Cardiolite stress test.  Deemed stable for discharge to home            Discharge Medications:   Current Discharge Medication List      START taking these medications    Details   nitroglycerin (NITROSTAT) 0.4 mg SL tablet 1 Tab by SubLINGual route as needed for Chest Pain for up to 5 days. Up to 3 doses. Qty: 15 Tab, Refills: 0         CONTINUE these medications which have NOT CHANGED    Details   aspirin delayed-release 81 mg tablet Take  by mouth daily. clopidogreL (PLAVIX) 75 mg tab Take  by mouth. atorvastatin (LIPITOR) 40 mg tablet Take  by mouth daily. amLODIPine (NORVASC) 5 mg tablet Take 5 mg by mouth daily. Omeprazole delayed release (PRILOSEC D/R) 20 mg tablet Take 20 mg by mouth daily. Follow up Care:    1. Chante Banerjee MD in 1-2 weeks. Please call to set up an appointment shortly after discharge. Diet:  Cardiac Diet    Disposition:  Home. Advanced Directive:   FULL    DNR      Discharge Exam:  Visit Vitals  BP (!) 109/55   Pulse (!) 56   Temp 97.4 °F (36.3 °C)   Resp 17   Ht 5' 10\" (1.778 m)   Wt 77.1 kg (170 lb)   SpO2 96%   BMI 24.39 kg/m²      O2 Device: Room air    Temp (24hrs), Av.9 °F (36.6 °C), Min:97.4 °F (36.3 °C), Max:98.5 °F (36.9 °C)    No intake/output data recorded.  1901 -  0700  In: 170 [P.O.:150; I.V.:20]  Out: -     General:  Alert, cooperative, no distress, appears stated age. Lungs:   Clear to auscultation bilaterally. Chest wall:  No tenderness or deformity. Heart:  Regular rate and rhythm, S1, S2 normal, no murmur, click, rub or gallop. Abdomen:   Soft, non-tender. Bowel sounds normal. No masses,  No organomegaly. Extremities: Extremities normal, atraumatic, no cyanosis or edema. Pulses: 2+ and symmetric all extremities. Skin: Skin color, texture, turgor normal. No rashes or lesions   Neurologic: CNII-XII intact. No gross sensory or motor deficits         CONSULTATIONS: Cardiology    Significant Diagnostic Studies:   3/4/2021: BUN 25 mg/dL* (Ref range: 6 - 20 mg/dL); Calcium 9.2 mg/dL (Ref range: 8.5 - 10.1 mg/dL); CO2 27 mmol/L (Ref range: 21 - 32 mmol/L); Creatinine 1.61 mg/dL* (Ref range: 0.70 - 1.30 mg/dL); Glucose 101 mg/dL* (Ref range: 65 - 100 mg/dL); HCT 44.2 % (Ref range: 41 - 53 %); HGB 14.8 g/dL (Ref range: 13.5 - 17.5 g/dL); Potassium 3.8 mmol/L (Ref range: 3.5 - 5.1 mmol/L); Sodium 140 mmol/L (Ref range: 136 - 145 mmol/L)  3/5/2021: BUN 25 mg/dL* (Ref range: 6 - 20 mg/dL); Calcium 9.0 mg/dL (Ref range: 8.5 - 10.1 mg/dL); CO2 23 mmol/L (Ref range: 21 - 32 mmol/L); Creatinine 1.63 mg/dL* (Ref range: 0.70 - 1.30 mg/dL); Glucose 101 mg/dL* (Ref range: 65 - 100 mg/dL); HCT 42.4 % (Ref range: 41 - 53 %); HGB 14.0 g/dL (Ref range: 13.5 - 17.5 g/dL);  Potassium 4.2 mmol/L (Ref range: 3.5 - 5.1 mmol/L); Sodium 140 mmol/L (Ref range: 136 - 145 mmol/L)  Recent Labs     03/05/21  0512 03/04/21  0840   WBC 7.5 8.3   HGB 14.0 14.8   HCT 42.4 44.2    208     Recent Labs     03/05/21  0512 03/04/21  0840    140   K 4.2 3.8    106   CO2 23 27   BUN 25* 25*   CREA 1.63* 1.61*   * 101*   CA 9.0 9.2     Recent Labs     03/04/21  0840   ALT 13   *   TBILI 0.4   TP 7.8   ALB 3.5   GLOB 4.3*     No results for input(s): INR, PTP, APTT, INREXT in the last 72 hours. No results for input(s): FE, TIBC, PSAT, FERR in the last 72 hours. No results for input(s): PH, PCO2, PO2 in the last 72 hours.   Recent Labs     03/04/21  0840   CPK 40     No results found for: GLUCPOC    Total Time: 30 minutes    Signed:  Emanuel Sol MD  3/5/2021  9:49 AM

## 2021-03-05 NOTE — CONSULTS
CONSULTATION    REASON FOR CONSULT:  Chest Pain for established [facility]     REQUESTING PROVIDER:  Dr. Jorge Saleem:    Chief Complaint   Patient presents with    Chest Pain         HISTORY OF PRESENT ILLNESS:  Shawn Cisneros is a 68y.o. year-old male with past medical history significant for HTN, CAD s/p PCI LCx 11/2019, Moderate Aortic Stenosis, and Chronic Lung Disease who presented to ED for evaluation of Chest Pain. Patient reports left chest pressure intermittently for the past few weeks. Pain became persistent @ 3am the day of arrival and it persisted, so he presented to ED for further workup given history. No specific aggravating or alleviating factors. Workup in ED was unremarkable, so was referred for further monitoring given continued sx. Records from hospital admission course thus far reviewed. Telemetry Review: SR      PAST MEDICAL HISTORY:    Past Medical History:   Diagnosis Date    Aortic stenosis     Arthritis     Chronic pain     LOWER BACK    GERD (gastroesophageal reflux disease)     Hypertension     Other ill-defined conditions(799.89)     HIGH CHOLESTEROL    Pulmonary fibrosis ([address]     Stroke ([facility] [address]     TIA         HOME MEDICATIONS:    Prior to Admission Medications   Prescriptions Last Dose Informant Patient Reported? Taking? Omeprazole delayed release (PRILOSEC D/R) 20 mg tablet  Self Yes No   Sig: Take 20 mg by mouth daily. amLODIPine (NORVASC) 5 mg tablet   Yes Yes   Sig: Take 5 mg by mouth daily. aspirin delayed-release 81 mg tablet   Yes Yes   Sig: Take  by mouth daily. atorvastatin (LIPITOR) 40 mg tablet   Yes Yes   Sig: Take  by mouth daily. clopidogreL (PLAVIX) 75 mg tab   Yes Yes   Sig: Take  by mouth.       Facility-Administered Medications: None         ALLERGIES:  No Known Allergies      FAMILY HISTORY:    Family History   Problem Relation Age of Onset    Stroke Mother     Cancer Father         LEUKEMIA    Cancer Sister BRAIN TUMOR    COPD Sister     Cancer Sister         KIDNEY         SOCIAL HISTORY:    Tobacco: +  Drugs: denieis  ETOH: occasionally      REVIEW OF SYSTEMS:  Complete review of systems performed, pertinents noted above, all other systems are negative. Patient Vitals for the past 24 hrs:   Temp Pulse Resp BP SpO2   03/05/21 0800  (!) 56      03/05/21 0709 97.4 °F (36.3 °C) (!) 55 17 (!) 109/55 96 %   03/05/21 0427 97.9 °F (36.6 °C) (!) 55 18 (!) 101/56 94 %   03/05/21 0348  (!) 51      03/04/21 2354  (!) 53      03/04/21 2311 98 °F (36.7 °C) (!) 56 18 106/73 95 %   03/04/21 2020 98.5 °F (36.9 °C) (!) 59 18 119/73 92 %   03/04/21 2000  (!) 53      03/04/21 1640 97.9 °F (36.6 °C) (!) 56 18 121/63    03/04/21 1600  (!) 51      03/04/21 1340 97.7 °F (36.5 °C) (!) 57 18 (!) 141/70    03/04/21 1300  63 16 131/66 99 %   03/04/21 1245  72 20 (!) 142/73 93 %   03/04/21 1230  69 22 (!) 143/94 95 %   03/04/21 1215  (!) 52 15 (!) 141/74    03/04/21 1200  (!) 54 16 (!) 163/78 96 %   03/04/21 1145  (!) 49 15 (!) 149/78 99 %   03/04/21 1130  (!) 54 19 (!) 154/91 97 %   03/04/21 1045  (!) 55 14 (!) 144/75 97 %   03/04/21 1030  (!) 54 16 134/67 98 %       PHYSICAL EXAMINATION:    General: Well nourished, NAD, A&O  HEENT: Normocephalic, PERRL, no drainage  Neck: Supple, Trachea midline, No JVD  RESP: CTA bilaterally with symmetrical chest movement. No SOB or distress. On RA  Cardiovascular: RRR no MRG  PVS: No rubor, cyanosis, no edema, Radial, DP, PT pulses equal bilaterally  ABD: flat , soft NT, Normoactive BS  Derm: Warm/Dry/Intact with no lesions, normal turgor  Neuro: A&O PPTS, cranial nerves II- XII grossly intact via interaction with patient. No focal deficits  PSYCH: No anxiety or agitation    Recent labs results and imaging reviewed.       Recent Results (from the past 24 hour(s))   TROPONIN I    Collection Time: 03/04/21 10:45 AM   Result Value Ref Range    Troponin-I, Qt. 0.05 (H) <0.05 ng/mL   ECHO ADULT FOLLOW-UP OR LIMITED    Collection Time: 03/04/21  1:26 PM   Result Value Ref Range    BP EF 65.6 55 - 100 percent    LV Ejection Fraction MOD 2C 66 percent    LV Ejection Fraction MOD 4C 60 percent    LV ED Vol BP 50.83 (A) 67 - 155 mL    LV ED Vol BP 50.83 (A) 67 - 155 mL    LV ES Vol A2C 13.11 mL    LV ES Vol BP 17.47 (A) 22 - 58 mL    LV ES Vol BP 17.47 (A) 22 - 58 mL    LVES Vol Index A2C 6.7 mL/m2   TROPONIN I    Collection Time: 03/04/21  5:45 PM   Result Value Ref Range    Troponin-I, Qt. 0.06 (H) <0.05 ng/mL   TROPONIN I    Collection Time: 03/04/21  6:20 PM   Result Value Ref Range    Troponin-I, Qt. 0.07 (H) <8.23 ng/mL   METABOLIC PANEL, BASIC    Collection Time: 03/05/21  5:12 AM   Result Value Ref Range    Sodium 140 136 - 145 mmol/L    Potassium 4.2 3.5 - 5.1 mmol/L    Chloride 108 97 - 108 mmol/L    CO2 23 21 - 32 mmol/L    Anion gap 9 5 - 15 mmol/L    Glucose 101 (H) 65 - 100 mg/dL    BUN 25 (H) 6 - 20 mg/dL    Creatinine 1.63 (H) 0.70 - 1.30 mg/dL    BUN/Creatinine ratio 15 12 - 20      GFR est AA 50 (L) >60 ml/min/1.73m2    GFR est non-AA 41 (L) >60 ml/min/1.73m2    Calcium 9.0 8.5 - 10.1 mg/dL   CBC W/O DIFF    Collection Time: 03/05/21  5:12 AM   Result Value Ref Range    WBC 7.5 4.4 - 11.3 K/uL    RBC 4.61 4.50 - 5.90 M/uL    HGB 14.0 13.5 - 17.5 g/dL    HCT 42.4 41 - 53 %    MCV 91.9 80 - 100 FL    MCH 30.3 (L) 31 - 34 PG    MCHC 32.9 31.0 - 36.0 g/dL    RDW 15.1 (H) 11.5 - 14.5 %    PLATELET 575 K/uL    MPV 8.8 6.5 - 11.5 FL    NRBC 0.1  WBC    ABSOLUTE NRBC 0.01 K/uL   TROPONIN I    Collection Time: 03/05/21  5:12 AM   Result Value Ref Range    Troponin-I, Qt. 0.07 (H) <0.05 ng/mL           Current Facility-Administered Medications:     nitroglycerin (NITROSTAT) tablet 0.4 mg, 0.4 mg, SubLINGual, PRN, Irina Rangel MD    sodium chloride (NS) flush 5-40 mL, 5-40 mL, IntraVENous, Q8H, Irina Rangel MD, 10 mL at 03/05/21 0551    sodium chloride (NS) flush 5-40 mL, 5-40 mL, IntraVENous, PRN, Bharti Martinez MD    acetaminophen (TYLENOL) tablet 650 mg, 650 mg, Oral, Q6H PRN, 650 mg at 03/04/21 1240 **OR** acetaminophen (TYLENOL) suppository 650 mg, 650 mg, Rectal, Q6H PRN, Bharti Martinez MD    polyethylene glycol (MIRALAX) packet 17 g, 17 g, Oral, DAILY PRN, Bharti Martinez MD    promethazine (PHENERGAN) tablet 12.5 mg, 12.5 mg, Oral, Q6H PRN **OR** ondansetron (ZOFRAN) injection 4 mg, 4 mg, IntraVENous, Q6H PRN, Bharti Martinez MD    heparin (porcine) injection 5,000 Units, 5,000 Units, SubCUTAneous, Q12H, Bharti Martinez MD, 5,000 Units at 03/05/21 0004    amLODIPine (NORVASC) tablet 5 mg, 5 mg, Oral, DAILY, Bharti Martinez MD, 5 mg at 03/05/21 0800    aspirin delayed-release tablet 81 mg, 81 mg, Oral, DAILY, Bharti Martinez MD, 81 mg at 03/05/21 0759    atorvastatin (LIPITOR) tablet 40 mg, 40 mg, Oral, DAILY, Bharti Martinez MD, 40 mg at 03/05/21 0759    clopidogreL (PLAVIX) tablet 75 mg, 75 mg, Oral, DAILY, Bharti Martinez MD, 75 mg at 03/05/21 0758    pantoprazole (PROTONIX) tablet 40 mg, 40 mg, Oral, ACB, Bharti Martinez MD, 40 mg at 03/05/21 0759    melatonin (rapid dissolve) tablet 5 mg, 5 mg, Oral, QHS, Kadi Cesar, NP, 5 mg at 03/04/21 6888          Case discussed with collaborating physician Dr. Ginger Dixon and our impression and recommendations are as follows:   1. Chests Pain - ACS has been ruled out via Troponin and EKG criteria. Given patient's concerning sx will plan to obtain NST in OP setting in the next week. Agree with Rx for SL NTG. Follow-up in clinic after NST. 2. HTN - BP closer to goal. Continue current regimen. 3. CAD: continue DAPT and statin. Plan as above. 4. Moderate AV Stenosis - continue to monitor and medical management as above. Thank you for involving us in the care of this patient. Please do not hesitate to call if additional questions arise.  If after hours please call [phone]

## 2021-03-05 NOTE — ROUTINE PROCESS
GAIL COPE NP FOR ORDERS. PATIENT REQUESTING MEDICATION TO HELP HIM SLEEP. STATES SHE WILL PUT ORDERS IN.

## 2021-03-05 NOTE — PROGRESS NOTES
Problem: Falls - Risk of  Goal: *Absence of Falls  Description: Document Dane Steel Fall Risk and appropriate interventions in the flowsheet.   3/5/2021 0956 by Amaury Wheat RN  Outcome: Resolved/Met  Note: Fall Risk Interventions:            Medication Interventions: Patient to call before getting OOB, Teach patient to arise slowly    Elimination Interventions: Call light in reach           3/5/2021 0728 by Amaury Wheat RN  Outcome: Progressing Towards Goal  Note: Fall Risk Interventions:            Medication Interventions: Patient to call before getting OOB    Elimination Interventions: Call light in reach              Problem: Patient Education: Go to Patient Education Activity  Goal: Patient/Family Education  3/5/2021 0956 by Amaury Wheat RN  Outcome: Resolved/Met  3/5/2021 0728 by Amaury Wheat RN  Outcome: Progressing Towards Goal

## 2021-03-05 NOTE — PROGRESS NOTES
Problem: Falls - Risk of  Goal: *Absence of Falls  Description: Document Robbi Alfaro Fall Risk and appropriate interventions in the flowsheet.   Note: Fall Risk Interventions:            Medication Interventions: Patient to call before getting OOB

## 2021-03-05 NOTE — ROUTINE PROCESS
REPORT GIVEN TO Re Priest. PATIENT REPORTING L SHOULDER PAIN. DENIES CHEST PAIN OR NEED FOR PAIN MEDICATION. RESP EVEN AND UNLABORED WITHOUT DISTRESS.

## 2021-03-05 NOTE — PROGRESS NOTES
Problem: Falls - Risk of  Goal: *Absence of Falls  Description: Document Umm Peralta Fall Risk and appropriate interventions in the flowsheet.   Outcome: Progressing Towards Goal  Note: Fall Risk Interventions:            Medication Interventions: Patient to call before getting OOB    Elimination Interventions: Call light in reach              Problem: Patient Education: Go to Patient Education Activity  Goal: Patient/Family Education  Outcome: Progressing Towards Goal

## 2021-03-23 ENCOUNTER — TRANSCRIBE ORDER (OUTPATIENT)
Dept: SCHEDULING | Age: 81
End: 2021-03-23

## 2021-03-23 DIAGNOSIS — R07.9 CHEST PAIN: Primary | ICD-10-CM

## 2021-04-02 ENCOUNTER — HOSPITAL ENCOUNTER (OUTPATIENT)
Dept: NON INVASIVE DIAGNOSTICS | Age: 81
Discharge: HOME | End: 2021-04-02
Attending: NURSE PRACTITIONER
Payer: MEDICARE

## 2021-04-02 ENCOUNTER — HOSPITAL ENCOUNTER (OUTPATIENT)
Dept: NUCLEAR MEDICINE | Age: 81
Discharge: HOME | End: 2021-04-02
Attending: NURSE PRACTITIONER
Payer: MEDICARE

## 2021-04-02 VITALS — BODY MASS INDEX: 24.05 KG/M2 | HEIGHT: 70 IN | WEIGHT: 168 LBS

## 2021-04-02 DIAGNOSIS — R07.9 CHEST PAIN: ICD-10-CM

## 2021-04-02 LAB
STRESS BASELINE DIAS BP: 75 MMHG
STRESS BASELINE HR: 58 BPM
STRESS BASELINE SYS BP: 133 MMHG
STRESS ESTIMATED WORKLOAD: 1 METS
STRESS EXERCISE DUR MIN: NORMAL MIN:SEC
STRESS PEAK DIAS BP: 76 MMHG
STRESS PEAK SYS BP: 135 MMHG
STRESS PERCENT HR ACHIEVED: 45 %
STRESS POST PEAK HR: 64 BPM
STRESS RATE PRESSURE PRODUCT: 8640 BPM*MMHG
STRESS ST DEPRESSION: 0 MM
STRESS ST ELEVATION: 0 MM
STRESS TARGET HR: 142 BPM

## 2021-04-02 PROCEDURE — 93017 CV STRESS TEST TRACING ONLY: CPT

## 2021-04-02 PROCEDURE — 74011000258 HC RX REV CODE- 258: Performed by: NURSE PRACTITIONER

## 2021-04-02 PROCEDURE — A9500 TC99M SESTAMIBI: HCPCS

## 2021-04-02 PROCEDURE — 74011250636 HC RX REV CODE- 250/636: Performed by: NURSE PRACTITIONER

## 2021-04-02 RX ADMIN — ADENOSINE: 3 INJECTION INTRAVENOUS at 08:51

## 2021-11-30 ENCOUNTER — TRANSCRIBE ORDER (OUTPATIENT)
Dept: REGISTRATION | Age: 81
End: 2021-11-30

## 2021-11-30 ENCOUNTER — HOSPITAL ENCOUNTER (OUTPATIENT)
Dept: LAB | Age: 81
Discharge: HOME | End: 2021-11-30
Payer: MEDICARE

## 2021-11-30 DIAGNOSIS — R25.2 MUSCLE CRAMPS: ICD-10-CM

## 2021-11-30 DIAGNOSIS — I25.10 CAD (CORONARY ARTERY DISEASE): ICD-10-CM

## 2021-11-30 DIAGNOSIS — E78.5 HLD (HYPERLIPIDEMIA): ICD-10-CM

## 2021-11-30 DIAGNOSIS — E78.5 HLD (HYPERLIPIDEMIA): Primary | ICD-10-CM

## 2021-11-30 LAB
ALBUMIN SERPL-MCNC: 3.8 G/DL (ref 3.5–5)
ALBUMIN/GLOB SERPL: 1 {RATIO} (ref 1.1–2.2)
ALP SERPL-CCNC: 109 U/L (ref 45–117)
ALT SERPL-CCNC: 18 U/L (ref 12–78)
ANION GAP SERPL CALC-SCNC: 10 MMOL/L (ref 5–15)
AST SERPL W P-5'-P-CCNC: 19 U/L (ref 15–37)
BASOPHILS # BLD: ABNORMAL K/UL
BASOPHILS NFR BLD: ABNORMAL %
BILIRUB SERPL-MCNC: 0.5 MG/DL (ref 0.2–1)
BUN SERPL-MCNC: 27 MG/DL (ref 6–20)
BUN/CREAT SERPL: 18 (ref 12–20)
CA-I BLD-MCNC: 9 MG/DL (ref 8.5–10.1)
CHLORIDE SERPL-SCNC: 108 MMOL/L (ref 97–108)
CHOLEST SERPL-MCNC: 164 MG/DL
CK SERPL-CCNC: 61.29 U/L (ref 39–308)
CO2 SERPL-SCNC: 26 MMOL/L (ref 21–32)
CREAT SERPL-MCNC: 1.51 MG/DL (ref 0.7–1.3)
DIFFERENTIAL METHOD BLD: ABNORMAL
EOSINOPHIL # BLD: ABNORMAL K/UL
EOSINOPHIL NFR BLD: ABNORMAL %
ERYTHROCYTE [DISTWIDTH] IN BLOOD BY AUTOMATED COUNT: 14.9 % (ref 11.5–14.5)
EST. AVERAGE GLUCOSE BLD GHB EST-MCNC: 103 MG/DL
GLOBULIN SER CALC-MCNC: 3.7 G/DL (ref 2–4)
GLUCOSE SERPL-MCNC: 102 MG/DL (ref 65–100)
HBA1C MFR BLD: 5.2 % (ref 4–5.6)
HCT VFR BLD AUTO: 47.9 % (ref 41–53)
HDLC SERPL-MCNC: 36 MG/DL
HDLC SERPL: 4.6 {RATIO} (ref 0–5)
HGB BLD-MCNC: 16.2 G/DL (ref 13.5–17.5)
IMM GRANULOCYTES # BLD AUTO: ABNORMAL K/UL
IMM GRANULOCYTES NFR BLD AUTO: ABNORMAL %
LDLC SERPL CALC-MCNC: 104.8 MG/DL (ref 0–100)
LIPID PROFILE,FLP: ABNORMAL
LYMPHOCYTES # BLD: ABNORMAL K/UL
LYMPHOCYTES NFR BLD: ABNORMAL %
MAGNESIUM SERPL-MCNC: 2.1 MG/DL (ref 1.6–2.4)
MCH RBC QN AUTO: 31.6 PG (ref 31–34)
MCHC RBC AUTO-ENTMCNC: 33.8 G/DL (ref 31–36)
MCV RBC AUTO: 93.5 FL (ref 80–100)
MONOCYTES # BLD: ABNORMAL K/UL
MONOCYTES NFR BLD: ABNORMAL %
MYOGLOBIN SERPL-MCNC: 60 NG/ML (ref 16–116)
NEUTS SEG # BLD: ABNORMAL K/UL
NEUTS SEG NFR BLD: ABNORMAL %
NRBC # BLD: 0.01 K/UL
NRBC BLD-RTO: 0.2 PER 100 WBC
PLATELET # BLD AUTO: 175 K/UL (ref 150–400)
PMV BLD AUTO: 9 FL (ref 6.5–11.5)
POTASSIUM SERPL-SCNC: 4.1 MMOL/L (ref 3.5–5.1)
PROT SERPL-MCNC: 7.5 G/DL (ref 6.4–8.2)
RBC # BLD AUTO: 5.12 M/UL (ref 4.5–5.9)
SODIUM SERPL-SCNC: 144 MMOL/L (ref 136–145)
TRIGL SERPL-MCNC: 116 MG/DL (ref ?–150)
TSH SERPL DL<=0.05 MIU/L-ACNC: 4.26 UIU/ML (ref 0.36–3.74)
VLDLC SERPL CALC-MCNC: 23.2 MG/DL
WBC # BLD AUTO: 8.3 K/UL (ref 4.4–11.3)

## 2021-11-30 PROCEDURE — 83036 HEMOGLOBIN GLYCOSYLATED A1C: CPT

## 2021-11-30 PROCEDURE — 36415 COLL VENOUS BLD VENIPUNCTURE: CPT

## 2021-11-30 PROCEDURE — 83735 ASSAY OF MAGNESIUM: CPT

## 2021-11-30 PROCEDURE — 80061 LIPID PANEL: CPT

## 2021-11-30 PROCEDURE — 84443 ASSAY THYROID STIM HORMONE: CPT

## 2021-11-30 PROCEDURE — 85025 COMPLETE CBC W/AUTO DIFF WBC: CPT

## 2021-11-30 PROCEDURE — 82550 ASSAY OF CK (CPK): CPT

## 2021-11-30 PROCEDURE — 80053 COMPREHEN METABOLIC PANEL: CPT

## 2021-11-30 PROCEDURE — 83874 ASSAY OF MYOGLOBIN: CPT

## 2021-12-02 ENCOUNTER — TRANSCRIBE ORDER (OUTPATIENT)
Dept: SCHEDULING | Age: 81
End: 2021-12-02

## 2021-12-02 DIAGNOSIS — I35.9 NONRHEUMATIC AORTIC VALVE DISORDER: Primary | ICD-10-CM

## 2022-01-25 ENCOUNTER — HOSPITAL ENCOUNTER (OUTPATIENT)
Dept: VASCULAR SURGERY | Age: 82
Discharge: HOME | End: 2022-01-25
Attending: NURSE PRACTITIONER
Payer: MEDICARE

## 2022-01-25 ENCOUNTER — TRANSCRIBE ORDER (OUTPATIENT)
Dept: REGISTRATION | Age: 82
End: 2022-01-25

## 2022-01-25 DIAGNOSIS — I49.3 PVC'S (PREMATURE VENTRICULAR CONTRACTIONS): ICD-10-CM

## 2022-01-25 DIAGNOSIS — I35.9 NONRHEUMATIC AORTIC VALVE DISORDER: ICD-10-CM

## 2022-01-25 DIAGNOSIS — I49.3 PVC'S (PREMATURE VENTRICULAR CONTRACTIONS): Primary | ICD-10-CM

## 2022-01-25 LAB
ECHO AR MAX VEL PISA: 3.3 M/S
ECHO AV AREA PEAK VELOCITY: 1.4 CM2
ECHO AV AREA PEAK VELOCITY: 1.4 CM2
ECHO AV AREA VTI: 1.3 CM2
ECHO AV AREA VTI: 1.4 CM2
ECHO AV MEAN GRADIENT: 15 MMHG
ECHO AV PEAK GRADIENT: 23 MMHG
ECHO AV PEAK VELOCITY: 2.4 M/S
ECHO AV REGURGITANT PHT: 671.4 MILLISECOND
ECHO AV VTI: 50.6 CM
ECHO LA VOL 2C: 42 ML (ref 18–58)
ECHO LA VOL 2C: 43 ML (ref 18–58)
ECHO LV EDV A2C: 45 ML
ECHO LV EDV A4C: 57 ML
ECHO LV EDV BP: 52 ML (ref 67–155)
ECHO LV EDV BP: 52 ML (ref 67–155)
ECHO LV EJECTION FRACTION A2C: 60 %
ECHO LV EJECTION FRACTION BIPLANE: 55 % (ref 55–100)
ECHO LV EJECTION FRACTION BIPLANE: 55 % (ref 55–100)
ECHO LV ESV A2C: 18 ML
ECHO LV ESV A4C: 29 ML
ECHO LV FRACTIONAL SHORTENING: 34 % (ref 28–44)
ECHO LV INTERNAL DIMENSION DIASTOLIC: 4.1 CM (ref 4.2–5.9)
ECHO LV INTERNAL DIMENSION SYSTOLIC: 2.7 CM
ECHO LV IVSD: 1.3 CM (ref 0.6–1)
ECHO LV MASS 2D: 204.9 G (ref 88–224)
ECHO LV POSTERIOR WALL DIASTOLIC: 1.4 CM (ref 0.6–1)
ECHO LV RELATIVE WALL THICKNESS RATIO: 0.68
ECHO LVOT AREA: 3.5 CM2
ECHO LVOT AV VTI INDEX: 0.39
ECHO LVOT DIAM: 2.1 CM
ECHO LVOT MEAN GRADIENT: 2 MMHG
ECHO LVOT PEAK GRADIENT: 4 MMHG
ECHO LVOT SV: 67.9 ML
ECHO LVOT VTI: 19.6 CM
ECHO MV A VELOCITY: 0.78 M/S
ECHO MV AREA PHT: 3.4 CM2
ECHO MV E DECELERATION TIME (DT): 222.3 MS
ECHO MV E VELOCITY: 0.9 M/S
ECHO MV E/A RATIO: 1.15
ECHO MV PRESSURE HALF TIME (PHT): 64.5 MS
ECHO RA AREA 4C: 12 CM2
ECHO RV INTERNAL DIMENSION: 3.3 CM
ECHO RV TAPSE: 1.7 CM (ref 1.5–2)
ECHO TRICUSPID ANNULAR PEAK SYSTOLIC VELOCITY: 12 CM/S

## 2022-01-25 PROCEDURE — 93306 TTE W/DOPPLER COMPLETE: CPT

## 2022-01-25 PROCEDURE — 93225 XTRNL ECG REC<48 HRS REC: CPT

## 2022-02-01 ENCOUNTER — HOSPITAL ENCOUNTER (OUTPATIENT)
Dept: VASCULAR SURGERY | Age: 82
Discharge: HOME | End: 2022-02-01
Payer: MEDICARE

## 2022-02-01 ENCOUNTER — TRANSCRIBE ORDER (OUTPATIENT)
Dept: REGISTRATION | Age: 82
End: 2022-02-01

## 2022-02-01 DIAGNOSIS — R00.2 PALPITATIONS: ICD-10-CM

## 2022-02-01 DIAGNOSIS — I47.20 V-TACH: Primary | ICD-10-CM

## 2022-02-01 PROCEDURE — 93225 XTRNL ECG REC<48 HRS REC: CPT

## 2022-03-14 ENCOUNTER — APPOINTMENT (OUTPATIENT)
Dept: GENERAL RADIOLOGY | Age: 82
DRG: 196 | End: 2022-03-14
Attending: HOSPITALIST
Payer: MEDICARE

## 2022-03-14 ENCOUNTER — HOSPITAL ENCOUNTER (INPATIENT)
Age: 82
LOS: 2 days | Discharge: HOME | DRG: 196 | End: 2022-03-16
Attending: HOSPITALIST | Admitting: HOSPITALIST
Payer: MEDICARE

## 2022-03-14 DIAGNOSIS — J96.11 CHRONIC RESPIRATORY FAILURE WITH HYPOXIA (HCC): Primary | ICD-10-CM

## 2022-03-14 PROBLEM — J84.10 PULMONARY FIBROSIS (HCC): Status: ACTIVE | Noted: 2022-03-14

## 2022-03-14 PROBLEM — E86.0 DEHYDRATION: Status: ACTIVE | Noted: 2022-03-14

## 2022-03-14 PROBLEM — I95.1 ORTHOSTATIC HYPOTENSION: Status: ACTIVE | Noted: 2022-03-14

## 2022-03-14 LAB
ALBUMIN SERPL-MCNC: 3 G/DL (ref 3.5–5)
ALBUMIN/GLOB SERPL: 0.9 {RATIO} (ref 1.1–2.2)
ALP SERPL-CCNC: 88 U/L (ref 45–117)
ALT SERPL-CCNC: 21 U/L (ref 12–78)
ANION GAP SERPL CALC-SCNC: 9 MMOL/L (ref 5–15)
APPEARANCE UR: CLEAR
ARTERIAL PATENCY WRIST A: ABNORMAL
AST SERPL W P-5'-P-CCNC: 17 U/L (ref 15–37)
BACTERIA URNS QL MICRO: NEGATIVE /HPF
BASE DEFICIT BLDA-SCNC: 1.9 MMOL/L (ref 0–2)
BASOPHILS # BLD: 0 K/UL (ref 0–0.2)
BASOPHILS NFR BLD: 1 % (ref 0–2.5)
BDY SITE: ABNORMAL
BILIRUB SERPL-MCNC: 0.6 MG/DL (ref 0.2–1)
BILIRUB UR QL: NEGATIVE
BNP SERPL-MCNC: 3950 PG/ML
BUN SERPL-MCNC: 21 MG/DL (ref 6–20)
BUN/CREAT SERPL: 15 (ref 12–20)
CA-I BLD-MCNC: 8.6 MG/DL (ref 8.5–10.1)
CHLORIDE SERPL-SCNC: 107 MMOL/L (ref 97–108)
CO2 SERPL-SCNC: 26 MMOL/L (ref 21–32)
COHGB MFR BLD: 0.3 % (ref 0–5)
COLOR UR: ABNORMAL
CREAT SERPL-MCNC: 1.43 MG/DL (ref 0.7–1.3)
EOSINOPHIL # BLD: 0.5 K/UL (ref 0–0.7)
EOSINOPHIL NFR BLD: 5 % (ref 0.9–2.9)
ERYTHROCYTE [DISTWIDTH] IN BLOOD BY AUTOMATED COUNT: 15.8 % (ref 11.5–14.5)
FIO2 ON VENT: 32 %
GAS FLOW.O2 O2 DELIVERY SYS: 3 L/MIN
GLOBULIN SER CALC-MCNC: 3.5 G/DL (ref 2–4)
GLUCOSE SERPL-MCNC: 104 MG/DL (ref 65–100)
GLUCOSE UR STRIP.AUTO-MCNC: NEGATIVE MG/DL
HCO3 BLDA-SCNC: 22 MMOL/L (ref 22–26)
HCT VFR BLD AUTO: 41.4 % (ref 41–53)
HGB BLD OXIMETRY-MCNC: 14.5 G/DL (ref 13.5–17.5)
HGB BLD-MCNC: 13.5 G/DL (ref 13.5–17.5)
HGB UR QL STRIP: NEGATIVE
KETONES UR QL STRIP.AUTO: NEGATIVE MG/DL
LEUKOCYTE ESTERASE UR QL STRIP.AUTO: ABNORMAL
LYMPHOCYTES # BLD: 2 K/UL (ref 1–4.8)
LYMPHOCYTES NFR BLD: 20 % (ref 20.5–51.1)
MAGNESIUM SERPL-MCNC: 2.1 MG/DL (ref 1.6–2.4)
MCH RBC QN AUTO: 29.1 PG (ref 31–34)
MCHC RBC AUTO-ENTMCNC: 32.7 G/DL (ref 31–36)
MCV RBC AUTO: 89 FL (ref 80–100)
METHGB MFR BLD: 0.3 % (ref 0–5)
MONOCYTES # BLD: 0.8 K/UL (ref 0.2–2.4)
MONOCYTES NFR BLD: 8 % (ref 1.7–9.3)
NEUTS SEG # BLD: 6.6 K/UL (ref 1.8–7.7)
NEUTS SEG NFR BLD: 66 % (ref 42–75)
NITRITE UR QL STRIP.AUTO: NEGATIVE
NRBC # BLD: 0 K/UL
NRBC BLD-RTO: 0 PER 100 WBC
OXYHGB MFR BLD: 98 % (ref 95–100)
PCO2 BLDA: 35 MMHG (ref 35–45)
PH BLDA: 7.41 [PH] (ref 7.35–7.45)
PH UR STRIP: 6 [PH] (ref 5–8)
PLATELET # BLD AUTO: 185 K/UL (ref 150–400)
PMV BLD AUTO: 8.2 FL (ref 6.5–11.5)
PO2 BLDA: 117 MMHG (ref 70–100)
POTASSIUM SERPL-SCNC: 4.6 MMOL/L (ref 3.5–5.1)
PROT SERPL-MCNC: 6.5 G/DL (ref 6.4–8.2)
PROT UR STRIP-MCNC: NEGATIVE MG/DL
RBC # BLD AUTO: 4.65 M/UL (ref 4.5–5.9)
RBC #/AREA URNS HPF: ABNORMAL /HPF (ref 0–3)
SAO2% DEVICE SAO2% SENSOR NAME: ABNORMAL
SODIUM SERPL-SCNC: 142 MMOL/L (ref 136–145)
SP GR UR REFRACTOMETRY: 1.01 (ref 1–1.03)
SPECIMEN SITE: ABNORMAL
TSH SERPL DL<=0.05 MIU/L-ACNC: 2.53 UIU/ML (ref 0.36–3.74)
UA: UC IF INDICATED,UAUC: ABNORMAL
UROBILINOGEN UR QL STRIP.AUTO: 0.2 EU/DL (ref 0.2–1)
WBC # BLD AUTO: 9.9 K/UL (ref 4.4–11.3)
WBC URNS QL MICRO: ABNORMAL /HPF (ref 0–5)

## 2022-03-14 PROCEDURE — 94640 AIRWAY INHALATION TREATMENT: CPT

## 2022-03-14 PROCEDURE — 84443 ASSAY THYROID STIM HORMONE: CPT

## 2022-03-14 PROCEDURE — 80053 COMPREHEN METABOLIC PANEL: CPT

## 2022-03-14 PROCEDURE — 84439 ASSAY OF FREE THYROXINE: CPT

## 2022-03-14 PROCEDURE — 87077 CULTURE AEROBIC IDENTIFY: CPT

## 2022-03-14 PROCEDURE — 74011250636 HC RX REV CODE- 250/636: Performed by: HOSPITALIST

## 2022-03-14 PROCEDURE — 93005 ELECTROCARDIOGRAM TRACING: CPT

## 2022-03-14 PROCEDURE — 36415 COLL VENOUS BLD VENIPUNCTURE: CPT

## 2022-03-14 PROCEDURE — 83735 ASSAY OF MAGNESIUM: CPT

## 2022-03-14 PROCEDURE — 82803 BLOOD GASES ANY COMBINATION: CPT

## 2022-03-14 PROCEDURE — 83540 ASSAY OF IRON: CPT

## 2022-03-14 PROCEDURE — 74011000250 HC RX REV CODE- 250: Performed by: HOSPITALIST

## 2022-03-14 PROCEDURE — 83880 ASSAY OF NATRIURETIC PEPTIDE: CPT

## 2022-03-14 PROCEDURE — 87186 SC STD MICRODIL/AGAR DIL: CPT

## 2022-03-14 PROCEDURE — 71045 X-RAY EXAM CHEST 1 VIEW: CPT

## 2022-03-14 PROCEDURE — 65270000029 HC RM PRIVATE

## 2022-03-14 PROCEDURE — 85025 COMPLETE CBC W/AUTO DIFF WBC: CPT

## 2022-03-14 PROCEDURE — 36600 WITHDRAWAL OF ARTERIAL BLOOD: CPT

## 2022-03-14 PROCEDURE — 87086 URINE CULTURE/COLONY COUNT: CPT

## 2022-03-14 PROCEDURE — 81001 URINALYSIS AUTO W/SCOPE: CPT

## 2022-03-14 RX ORDER — ENOXAPARIN SODIUM 100 MG/ML
40 INJECTION SUBCUTANEOUS DAILY
Status: DISCONTINUED | OUTPATIENT
Start: 2022-03-15 | End: 2022-03-16 | Stop reason: HOSPADM

## 2022-03-14 RX ORDER — SODIUM CHLORIDE 0.9 % (FLUSH) 0.9 %
5-40 SYRINGE (ML) INJECTION AS NEEDED
Status: DISCONTINUED | OUTPATIENT
Start: 2022-03-14 | End: 2022-03-16 | Stop reason: HOSPADM

## 2022-03-14 RX ORDER — SODIUM CHLORIDE 9 MG/ML
75 INJECTION, SOLUTION INTRAVENOUS CONTINUOUS
Status: DISCONTINUED | OUTPATIENT
Start: 2022-03-14 | End: 2022-03-16

## 2022-03-14 RX ORDER — ONDANSETRON 2 MG/ML
4 INJECTION INTRAMUSCULAR; INTRAVENOUS
Status: DISCONTINUED | OUTPATIENT
Start: 2022-03-14 | End: 2022-03-16 | Stop reason: HOSPADM

## 2022-03-14 RX ORDER — ATORVASTATIN CALCIUM 40 MG/1
40 TABLET, FILM COATED ORAL DAILY
Status: DISCONTINUED | OUTPATIENT
Start: 2022-03-15 | End: 2022-03-16 | Stop reason: HOSPADM

## 2022-03-14 RX ORDER — ONDANSETRON 4 MG/1
4 TABLET, ORALLY DISINTEGRATING ORAL
Status: DISCONTINUED | OUTPATIENT
Start: 2022-03-14 | End: 2022-03-16 | Stop reason: HOSPADM

## 2022-03-14 RX ORDER — POLYETHYLENE GLYCOL 3350 17 G/17G
17 POWDER, FOR SOLUTION ORAL DAILY PRN
Status: DISCONTINUED | OUTPATIENT
Start: 2022-03-14 | End: 2022-03-16 | Stop reason: HOSPADM

## 2022-03-14 RX ORDER — PANTOPRAZOLE SODIUM 20 MG/1
40 TABLET, DELAYED RELEASE ORAL
Status: DISCONTINUED | OUTPATIENT
Start: 2022-03-15 | End: 2022-03-16 | Stop reason: HOSPADM

## 2022-03-14 RX ORDER — IPRATROPIUM BROMIDE AND ALBUTEROL SULFATE 2.5; .5 MG/3ML; MG/3ML
3 SOLUTION RESPIRATORY (INHALATION)
Status: DISCONTINUED | OUTPATIENT
Start: 2022-03-14 | End: 2022-03-16 | Stop reason: HOSPADM

## 2022-03-14 RX ORDER — MIDODRINE HYDROCHLORIDE 5 MG/1
5 TABLET ORAL 3 TIMES DAILY
Status: ON HOLD | COMMUNITY
End: 2022-03-16 | Stop reason: SDUPTHER

## 2022-03-14 RX ORDER — ASPIRIN 81 MG/1
81 TABLET ORAL DAILY
Status: DISCONTINUED | OUTPATIENT
Start: 2022-03-15 | End: 2022-03-16

## 2022-03-14 RX ORDER — SODIUM CHLORIDE 0.9 % (FLUSH) 0.9 %
5-40 SYRINGE (ML) INJECTION EVERY 8 HOURS
Status: DISCONTINUED | OUTPATIENT
Start: 2022-03-14 | End: 2022-03-16 | Stop reason: HOSPADM

## 2022-03-14 RX ORDER — CLOPIDOGREL BISULFATE 75 MG/1
75 TABLET ORAL DAILY
Status: DISCONTINUED | OUTPATIENT
Start: 2022-03-15 | End: 2022-03-16 | Stop reason: HOSPADM

## 2022-03-14 RX ORDER — ACETAMINOPHEN 325 MG/1
650 TABLET ORAL
Status: DISCONTINUED | OUTPATIENT
Start: 2022-03-14 | End: 2022-03-16 | Stop reason: HOSPADM

## 2022-03-14 RX ORDER — ACETAMINOPHEN 650 MG/1
650 SUPPOSITORY RECTAL
Status: DISCONTINUED | OUTPATIENT
Start: 2022-03-14 | End: 2022-03-16 | Stop reason: HOSPADM

## 2022-03-14 RX ADMIN — IPRATROPIUM BROMIDE AND ALBUTEROL SULFATE 3 ML: .5; 2.5 SOLUTION RESPIRATORY (INHALATION) at 15:28

## 2022-03-14 RX ADMIN — SODIUM CHLORIDE 1000 ML: 9 INJECTION, SOLUTION INTRAVENOUS at 13:34

## 2022-03-14 RX ADMIN — METHYLPREDNISOLONE SODIUM SUCCINATE 60 MG: 125 INJECTION, POWDER, FOR SOLUTION INTRAMUSCULAR; INTRAVENOUS at 15:31

## 2022-03-14 RX ADMIN — IPRATROPIUM BROMIDE AND ALBUTEROL SULFATE 3 ML: .5; 2.5 SOLUTION RESPIRATORY (INHALATION) at 19:57

## 2022-03-14 RX ADMIN — SODIUM CHLORIDE 75 ML/HR: 9 INJECTION, SOLUTION INTRAVENOUS at 15:31

## 2022-03-14 NOTE — ROUTINE PROCESS
Lying /67 HR 83  Sitting BP 97/56 HR 69  Standing /55 HR68  No dizziness. Denies pain.  Pulse ox 100% on O2 nc 3L/m

## 2022-03-14 NOTE — H&P
History and Physical      Chief Complaints:   Sob with exertion and orthostatic hypotension       Subjective:     Jake Singh is a 80 y.o. male followed by Robson Coffman MD and Cardiology Iris March NP and Pulmonologist Dr. Linnette Esquivel has a past medical history of Aortic stenosis, Arthritis, Chronic pain, CAD with stents x 2, GERD (gastroesophageal reflux disease), Hypertension, Pulmonary fibrosis (HCC),copd/emphysema and Stroke ([address] Presents as direct admit from [facility] for orthostatic hypotension. Patient was seen last Friday with hypotension and attempted on Midodrine but patient said it made him have urinary retention. Patient has recently been started on O2 at [facility] after testing that was done by Dr. Gwendolyn Higuera. Patient while at rest does well on the currently 3L NC but with any minimal exertion patient notes extreme sob, dizziness and chest tightness. During this same time frame of 2-3 months patient notes weight loss of about 15lbs and decreased apepitie. Because of noted dyspnea, cardiology did echo in January which showed preserved EF and cardiac catherization showed current stents to be patent and no new blockages noted. Patient also had recent holter monitor which noted at times sinus marcelino with HR as low as 42 but also frequent ectopy and patient was noted to be symptomtatic and was started on Metoprolol dosing which helped but had to be stopped because of hypotension. Patient denies any fevers, cough or sick contacts. Denies chest pain but does complain of chest tightness with exertion. No obvious evidence of fluid overload but BNP was elevated at 3,950. cxr is pending results at this time. Orthostatic vital signs were positive at cardiology office with blood pressure going from 120/60 to 104/60 and 84/60 standing. On arrival to the medical floor ABG was performed on 3L NC and showed good Po2 of 117.  And repeat orthostatics on arrival was Lying BP 134/67 HR 83, Sitting BP 97/56 HR 69  Standing /55 HR68 with no complaint of dizziness or chest pain. Prior to labs, ordered 1 L NS bolus dosing. Labs were similar to most recent check on 11/20/2021. With patient presentation, patient was referred for admission for orthostatic hypotension, weight loss, pulmonary fibrosis exacerbation, chronic hypoxic respiratory failure. Past Medical History:   Diagnosis Date    Aortic stenosis     Arthritis     Chronic pain     LOWER BACK    GERD (gastroesophageal reflux disease)     Hypertension     Other ill-defined conditions(799.89)     HIGH CHOLESTEROL    Pulmonary fibrosis ([address]     St[address] [address]     TIA      Past Surgical History:   Procedure Laterality Date    HX GI      COLONOSCOPY    HX HEENT  1997    NOSE FX     HX LUMBAR DISKECTOMY  1970'S    X2    HX ORTHOPAEDIC  2003    LT ANKLE FX W/ SCREWS AND PLATE    HX ORTHOPAEDIC  2009    MAYA. CARPAL TUNNEL    HX VASCULAR STENT  2019     Family History   Problem Relation Age of Onset    Stroke Mother     Cancer Father         LEUKEMIA    Cancer Sister         BRAIN TUMOR    COPD Sister     Cancer Sister         KIDNEY      Social History     Tobacco Use    Smoking status: Former Smoker     Years: 40.00    Smokeless tobacco: Never Used   Substance Use Topics    Alcohol use: Yes     Comment: Weekends       Prior to Admission medications    Medication Sig Start Date End Date Taking? Authorizing Provider   midodrine (PROAMATINE) 5 mg tablet Take 5 mg by mouth three (3) times daily. Yes Provider, Historical   aspirin delayed-release 81 mg tablet Take  by mouth daily. Yes Other, MD Suki   clopidogreL (PLAVIX) 75 mg tab Take 75 mg by mouth daily. Yes Other, MD Suki   atorvastatin (LIPITOR) 40 mg tablet Take 40 mg by mouth daily. Yes Soraya, MD Suki   amLODIPine (NORVASC) 5 mg tablet Take 5 mg by mouth daily.    Yes Suki Lira MD   Omeprazole delayed release (PRILOSEC D/R) 20 mg tablet Take 20 mg by mouth daily. Yes Provider, Historical     No Known Allergies     Review of Systems:  Review of Systems   Constitutional: Negative for chills, diaphoresis, fatigue and fever. HENT: Negative for congestion, ear pain, postnasal drip, sinus pain and sore throat. Eyes: Negative for pain, discharge and redness. Respiratory: Positive for shortness of breath. Negative for cough and wheezing. Cardiovascular: Positive for palpitations. Negative for chest pain. Gastrointestinal: Negative for abdominal pain, constipation, diarrhea, nausea and vomiting. Genitourinary: Negative for dysuria, flank pain, frequency and urgency. Musculoskeletal: Negative for arthralgias and myalgias. Neurological: Negative for dizziness, weakness and headaches. Psychiatric/Behavioral: Negative for agitation and hallucinations. The patient is not nervous/anxious. Objective:     Vitals:  Visit Vitals  BP (!) 119/53   Pulse 73   Temp 97.3 °F (36.3 °C)   Resp 18   Ht 5' 9\" (1.753 m)   Wt 72.6 kg (160 lb)   SpO2 97%   BMI 23.63 kg/m²       Physical Exam:  General: Alert, cooperative, no distress. Head:  Normocephalic, without obvious abnormality, atraumatic. Eyes:  Conjunctivae/corneas clear. Pupils equal, round, reactive to light. Extraocular movements intact. Lungs:  B/l air entry diminished with no wheeze but diffuse fine crackles noted  Chest wall: No tenderness or deformity. Heart:  Regular rate and rhythm, S1, S2 normal, no murmur, click, rub, or gallop. Abdomen:   Soft, non-tender. Bowel sounds normal. No masses. No organomegaly. Back:  No spine tenderness to palpation  Extremities: Extremities normal, atraumatic, no cyanosis or edema. Pulses: Symmetric all extremities. Skin: Skin color, texture, turgor normal.   Lymph nodes: Cervical nodes normal.  Neurologic: CNII-XII intact. Normal strength, sensation, and reflexes throughout.       Labs:  Recent Results (from the past 24 hour(s))   BLOOD GAS, ARTERIAL    Collection Time: 03/14/22  1:35 PM   Result Value Ref Range    pH 7.41 7.35 - 7.45      PCO2 35 35 - 45 mmHg    PO2 117 (H) 70 - 100 mmHg    BICARBONATE 22 22 - 26 mmol/L    BASE DEFICIT 1.9 0 - 2 mmol/L    O2 METHOD Nasal Cannula      O2 FLOW RATE 3 L/min    FIO2 32.0 %    Sample source Arterial      SITE Right Brachial      MARCIN'S TEST Not Applicable      Carboxy-Hgb 0.3 0 - 5 %    Methemoglobin 0.3 0 - 5 %    tHb 14.5 13.5 - 17.5 g/dL    Oxyhemoglobin 98.0 95 - 992 %   METABOLIC PANEL, COMPREHENSIVE    Collection Time: 03/14/22  1:56 PM   Result Value Ref Range    Sodium 142 136 - 145 mmol/L    Potassium 4.6 3.5 - 5.1 mmol/L    Chloride 107 97 - 108 mmol/L    CO2 26 21 - 32 mmol/L    Anion gap 9 5 - 15 mmol/L    Glucose 104 (H) 65 - 100 mg/dL    BUN 21 (H) 6 - 20 mg/dL    Creatinine 1.43 (H) 0.70 - 1.30 mg/dL    BUN/Creatinine ratio 15 12 - 20      GFR est AA 58 (L) >60 ml/min/1.73m2    GFR est non-AA 48 (L) >60 ml/min/1.73m2    Calcium 8.6 8.5 - 10.1 mg/dL    Bilirubin, total 0.6 0.2 - 1.0 mg/dL    AST (SGOT) 17 15 - 37 U/L    ALT (SGPT) 21 12 - 78 U/L    Alk. phosphatase 88 45 - 117 U/L    Protein, total 6.5 6.4 - 8.2 g/dL    Albumin 3.0 (L) 3.5 - 5.0 g/dL    Globulin 3.5 2.0 - 4.0 g/dL    A-G Ratio 0.9 (L) 1.1 - 2.2     CBC WITH AUTOMATED DIFF    Collection Time: 03/14/22  1:56 PM   Result Value Ref Range    WBC 9.9 4.4 - 11.3 K/uL    RBC 4.65 4.50 - 5.90 M/uL    HGB 13.5 13.5 - 17.5 g/dL    HCT 41.4 41 - 53 %    MCV 89.0 80 - 100 FL    MCH 29.1 (L) 31 - 34 PG    MCHC 32.7 31.0 - 36.0 g/dL    RDW 15.8 (H) 11.5 - 14.5 %    PLATELET 024 800 - 543 K/uL    MPV 8.2 6.5 - 11.5 FL    NRBC 0.0  WBC    ABSOLUTE NRBC 0.00 K/uL    NEUTROPHILS 66 42 - 75 %    LYMPHOCYTES 20 (L) 20.5 - 51.1 %    MONOCYTES 8 1.7 - 9.3 %    EOSINOPHILS 5 (H) 0.9 - 2.9 %    BASOPHILS 1 0.0 - 2.5 %    ABS. NEUTROPHILS 6.6 1.8 - 7.7 K/UL    ABS. LYMPHOCYTES 2.0 1.0 - 4.8 K/UL    ABS. MONOCYTES 0.8 0.2 - 2.4 K/UL    ABS. EOSINOPHILS 0.5 0.0 - 0.7 K/UL    ABS. BASOPHILS 0.0 0.0 - 0.2 K/UL   MAGNESIUM    Collection Time: 03/14/22  1:56 PM   Result Value Ref Range    Magnesium 2.1 1.6 - 2.4 mg/dL   NT-PRO BNP    Collection Time: 03/14/22  1:56 PM   Result Value Ref Range    NT pro-BNP 3,950 (H) <450 pg/mL       Imaging:  No results found.      Assessment & Plan:      Orthostatic Hypotension   - was positive at cardiology office and felt to be secondary to poor po intake / dehydration / weight loss   - monitor daily orthostatic pressures and vital signs every 4 hours  -Monitor on telemetry for evidences of activity  -1 L fluid bolus of normal saline and will continue normal saline at 75 an hour  -Initial BUN/creatinine 21/1.43 and follow closely  -Mag level is normal  -No evidence of fluid overload but BNP is at 3950  -Fall precautions  -TSH high mildly elevated at 4.26 on 11/30 will obtain repeat TSH and free T4 levels  -Recent 2D echo in January 2022 normal left ventricular size with preserved EF of 8969% diastolic dysfunction present with normal EF  -Recent cardiac catheterization and no new significant blockages and current stents are patent  -Cardiology evaluation    Acute on chronic hypoxic respiratory failure  -Patient recently started on 3 L nasal cannula continuous but continued to have shortness of breath with minimal exertion  -Follows with Dr. Sky Suárez  pulmonologist in [facility]  -chest xray showing reduced lung volumes with dense peripheral honeycombing reticular markings to the lungs evidence for pulmonary fibrosis  -Obtain D-dimer in a.m.  -May benefit CTA of chest in a.m. tomorrow evaluate for PE and possible neoplasm secondary to noted weight loss and poor p.o. intake  - ABG on 3L NC shows a pH of 7.41/CO2 of 35/PO2 of 117/HCO3 22  -Continue DuoNebs 4 times daily  -Continuous bedside O2 monitoring evaluate for any hypoxia while asleep  -We will need walking pulse ox testing prior to discharge    Acute on chronic pulmonary fibrosis exacerbation  -Has history of noted to have decreased air entry but with any exertion gets short of breath and chest tightness but denies any cough  -We will place on DuoNeb 4 times daily  -Start Solu-Medrol 60 mg IV every 12 hours  - chest xray showing reduced lung volumes with dense peripheral honeycombing reticular markings to the lungs evidence for pulmonary fibrosis  -Encourage incentive spirometry    Urinary retention, urgency , frequency  - states he has been having symptoms since midodrine started   - obtain analysis and urine culture      Dehydration:  -With poor p.o. intake and noted orthostatic hypotension at cardiology office  -15 pound weight loss recently  -Status post 1 L fluid bolus continue normal saline at 75 an hour  -BUN/creatinine of 21/1.43  -Monitor input output and daily weights  -Follow repeat labs in a.m.     Poor p.o. intake/weight loss  -Over last 2 3 months with decreased appetite  -Nutrition consultation for oral nutritional supplements  -Monitor p.o. intake and daily weights    CAD history of stent  -Continue current aspirin Plavix and statin therapy  -Monitor on telemetry has recent Holter shows episodes of ectopy and initially was placed on metoprolol but held secondary to hypotension  -History of recent cardiac catheterization showing patency of current stents and no new blockages  -Echo shows preserved EF    GI Prophylaxis  Continue patient's home PPI    DVT prophylaxis  -Lovenox    CODE STATUS: Full code  Patient designates his daughter Alexy Cotter as his medical decision-maker if for some reason he is unable to make decision for himself    Spent 50 minutes evaluating cording patient's admission to acute remote telemetry and expect at least 2 to 3 days of acute hospitalization      Electronically signed by Sarah Taylor MD on 3/14/2022 at 3:06 PM

## 2022-03-14 NOTE — ACP (ADVANCE CARE PLANNING)
Advance Care Planning   Healthcare Decision Maker:       Primary Decision Maker: Kaveh Me - Daughter - [phone]    Click here to complete [address] including selection of the Healthcare Decision Maker Relationship (ie \"Primary\")

## 2022-03-14 NOTE — PROGRESS NOTES
Care Management Interventions  PCP Verified by CM: Yes  Mode of Transport at Discharge: Other (see comment) (Family member)  Transition of Care Consult (CM Consult): Discharge Planning  Support Systems: Spouse/Significant Other,Child(cody)  Confirm Follow Up Transport: Family  The Plan for Transition of Care is Related to the Following Treatment Goals : Patient lives with his significant other. Also has support from his daughter. No discharge planning needs identified at this time.   Discharge Location  Patient Expects to be Discharged to[de-identified] Home with family assistance

## 2022-03-14 NOTE — ROUTINE PROCESS
Pt admitted to room 205. IV site started to [facility] IVF bolus infusing. EKG done. Instructed need urine specimen, states understanding. Pt [place]. O2 on per nc no resp distress noted.  Lab in at present

## 2022-03-15 ENCOUNTER — APPOINTMENT (OUTPATIENT)
Dept: CT IMAGING | Age: 82
DRG: 196 | End: 2022-03-15
Attending: HOSPITALIST
Payer: MEDICARE

## 2022-03-15 PROBLEM — I49.3 VENTRICULAR ECTOPY: Status: ACTIVE | Noted: 2022-03-15

## 2022-03-15 PROBLEM — I25.10 CAD (CORONARY ARTERY DISEASE): Status: ACTIVE | Noted: 2022-03-15

## 2022-03-15 LAB
ANION GAP SERPL CALC-SCNC: 9 MMOL/L (ref 5–15)
BASOPHILS # BLD: 0 K/UL (ref 0–0.2)
BASOPHILS NFR BLD: 0 % (ref 0–2.5)
BUN SERPL-MCNC: 19 MG/DL (ref 6–20)
BUN/CREAT SERPL: 14 (ref 12–20)
CA-I BLD-MCNC: 8.6 MG/DL (ref 8.5–10.1)
CHLORIDE SERPL-SCNC: 108 MMOL/L (ref 97–108)
CO2 SERPL-SCNC: 24 MMOL/L (ref 21–32)
CREAT SERPL-MCNC: 1.38 MG/DL (ref 0.7–1.3)
D DIMER PPP FEU-MCNC: 0.44 UG/ML(FEU)
EOSINOPHIL # BLD: 0 K/UL (ref 0–0.7)
EOSINOPHIL NFR BLD: 0 % (ref 0.9–2.9)
ERYTHROCYTE [DISTWIDTH] IN BLOOD BY AUTOMATED COUNT: 15.7 % (ref 11.5–14.5)
GLUCOSE SERPL-MCNC: 142 MG/DL (ref 65–100)
HCT VFR BLD AUTO: 38.1 % (ref 41–53)
HGB BLD-MCNC: 12.4 G/DL (ref 13.5–17.5)
IRON SATN MFR SERPL: 29 % (ref 20–50)
IRON SERPL-MCNC: 72 UG/DL (ref 35–150)
LYMPHOCYTES # BLD: 1.2 K/UL (ref 1–4.8)
LYMPHOCYTES NFR BLD: 13 % (ref 20.5–51.1)
MCH RBC QN AUTO: 28.9 PG (ref 31–34)
MCHC RBC AUTO-ENTMCNC: 32.5 G/DL (ref 31–36)
MCV RBC AUTO: 89.2 FL (ref 80–100)
MONOCYTES # BLD: 0.3 K/UL (ref 0.2–2.4)
MONOCYTES NFR BLD: 3 % (ref 1.7–9.3)
NEUTS SEG # BLD: 7.7 K/UL (ref 1.8–7.7)
NEUTS SEG NFR BLD: 84 % (ref 42–75)
NRBC # BLD: 0 K/UL
NRBC BLD-RTO: 0 PER 100 WBC
PLATELET # BLD AUTO: 167 K/UL (ref 150–400)
PMV BLD AUTO: 9 FL (ref 6.5–11.5)
POTASSIUM SERPL-SCNC: 4.6 MMOL/L (ref 3.5–5.1)
RBC # BLD AUTO: 4.27 M/UL (ref 4.5–5.9)
SODIUM SERPL-SCNC: 141 MMOL/L (ref 136–145)
T4 FREE SERPL-MCNC: 0.7 NG/DL (ref 0.8–1.5)
TIBC SERPL-MCNC: 246 UG/DL (ref 250–450)
WBC # BLD AUTO: 9.3 K/UL (ref 4.4–11.3)

## 2022-03-15 PROCEDURE — 80048 BASIC METABOLIC PNL TOTAL CA: CPT

## 2022-03-15 PROCEDURE — 71260 CT THORAX DX C+: CPT

## 2022-03-15 PROCEDURE — 85025 COMPLETE CBC W/AUTO DIFF WBC: CPT

## 2022-03-15 PROCEDURE — 74011250636 HC RX REV CODE- 250/636: Performed by: HOSPITALIST

## 2022-03-15 PROCEDURE — 94761 N-INVAS EAR/PLS OXIMETRY MLT: CPT

## 2022-03-15 PROCEDURE — 65270000029 HC RM PRIVATE

## 2022-03-15 PROCEDURE — 74011000250 HC RX REV CODE- 250: Performed by: HOSPITALIST

## 2022-03-15 PROCEDURE — 74011000636 HC RX REV CODE- 636: Performed by: HOSPITALIST

## 2022-03-15 PROCEDURE — 94762 N-INVAS EAR/PLS OXIMTRY CONT: CPT

## 2022-03-15 PROCEDURE — 77010033678 HC OXYGEN DAILY

## 2022-03-15 PROCEDURE — 74011250637 HC RX REV CODE- 250/637: Performed by: HOSPITALIST

## 2022-03-15 PROCEDURE — 51798 US URINE CAPACITY MEASURE: CPT

## 2022-03-15 PROCEDURE — 94640 AIRWAY INHALATION TREATMENT: CPT

## 2022-03-15 PROCEDURE — 85379 FIBRIN DEGRADATION QUANT: CPT

## 2022-03-15 RX ORDER — DILTIAZEM HYDROCHLORIDE 120 MG/1
120 CAPSULE, COATED, EXTENDED RELEASE ORAL DAILY
Status: DISCONTINUED | OUTPATIENT
Start: 2022-03-15 | End: 2022-03-16

## 2022-03-15 RX ORDER — ALPRAZOLAM 0.5 MG/1
0.5 TABLET ORAL
Status: DISCONTINUED | OUTPATIENT
Start: 2022-03-15 | End: 2022-03-16 | Stop reason: HOSPADM

## 2022-03-15 RX ORDER — MIDODRINE HYDROCHLORIDE 5 MG/1
10 TABLET ORAL
Status: DISCONTINUED | OUTPATIENT
Start: 2022-03-15 | End: 2022-03-15

## 2022-03-15 RX ORDER — MIDODRINE HYDROCHLORIDE 5 MG/1
10 TABLET ORAL
Status: DISCONTINUED | OUTPATIENT
Start: 2022-03-15 | End: 2022-03-16

## 2022-03-15 RX ADMIN — SODIUM CHLORIDE 75 ML/HR: 9 INJECTION, SOLUTION INTRAVENOUS at 21:11

## 2022-03-15 RX ADMIN — ALPRAZOLAM 0.5 MG: 0.5 TABLET ORAL at 22:12

## 2022-03-15 RX ADMIN — ATORVASTATIN CALCIUM 40 MG: 40 TABLET, FILM COATED ORAL at 08:32

## 2022-03-15 RX ADMIN — METHYLPREDNISOLONE SODIUM SUCCINATE 60 MG: 125 INJECTION, POWDER, FOR SOLUTION INTRAMUSCULAR; INTRAVENOUS at 08:32

## 2022-03-15 RX ADMIN — IOPAMIDOL 100 ML: 755 INJECTION, SOLUTION INTRAVENOUS at 09:24

## 2022-03-15 RX ADMIN — MIDODRINE HYDROCHLORIDE 10 MG: 5 TABLET ORAL at 16:58

## 2022-03-15 RX ADMIN — PANTOPRAZOLE SODIUM 40 MG: 20 TABLET, DELAYED RELEASE ORAL at 07:29

## 2022-03-15 RX ADMIN — SODIUM CHLORIDE, PRESERVATIVE FREE 10 ML: 5 INJECTION INTRAVENOUS at 13:32

## 2022-03-15 RX ADMIN — ALPRAZOLAM 0.5 MG: 0.5 TABLET ORAL at 15:49

## 2022-03-15 RX ADMIN — ENOXAPARIN SODIUM 40 MG: 100 INJECTION SUBCUTANEOUS at 08:32

## 2022-03-15 RX ADMIN — IPRATROPIUM BROMIDE AND ALBUTEROL SULFATE 3 ML: .5; 2.5 SOLUTION RESPIRATORY (INHALATION) at 15:15

## 2022-03-15 RX ADMIN — IPRATROPIUM BROMIDE AND ALBUTEROL SULFATE 3 ML: .5; 2.5 SOLUTION RESPIRATORY (INHALATION) at 11:28

## 2022-03-15 RX ADMIN — DILTIAZEM HYDROCHLORIDE 120 MG: 120 CAPSULE, COATED, EXTENDED RELEASE ORAL at 11:15

## 2022-03-15 RX ADMIN — IPRATROPIUM BROMIDE AND ALBUTEROL SULFATE 3 ML: .5; 2.5 SOLUTION RESPIRATORY (INHALATION) at 07:44

## 2022-03-15 RX ADMIN — CLOPIDOGREL BISULFATE 75 MG: 75 TABLET ORAL at 08:32

## 2022-03-15 RX ADMIN — SODIUM CHLORIDE 75 ML/HR: 9 INJECTION, SOLUTION INTRAVENOUS at 05:00

## 2022-03-15 RX ADMIN — MIDODRINE HYDROCHLORIDE 10 MG: 5 TABLET ORAL at 09:50

## 2022-03-15 RX ADMIN — IPRATROPIUM BROMIDE AND ALBUTEROL SULFATE 3 ML: .5; 2.5 SOLUTION RESPIRATORY (INHALATION) at 19:46

## 2022-03-15 RX ADMIN — ASPIRIN 81 MG: 81 TABLET, COATED ORAL at 08:32

## 2022-03-15 NOTE — CONSULTS
CONSULTATION    REASON FOR CONSULT:  Dyspnea and Orthostatic Hypotension for established [facility]     REQUESTING PROVIDER:  Dr. Eva Bunn:    Weakness, Dyspnea, Hypoxia      HISTORY OF PRESENT ILLNESS:  Sri Mas is a 80y.o. year-old male with past medical history significant for HTN, CAD s/p PCI LCx 11/2019, Mild Aortic Stenosis, Emphysema, Severe Pulmonary Fibrosis, and chronic hypoxia who is now dependent on 3L o2 via NC continuously who presented to [facility] with complaints of weakness, palpitations, and hypotension. Over the past 2 months patient has had increase in Ventricular Ectopy and Cath was repeated 2/23/2022 which showed patent stent and 50% lesion RCA that was not amenable to IFR s/t tortuosity of vessel. He was trialed on BB therapy which initially helped sx, but patient reported hypotension and fatigue so it had to be stopped 9 days ago. Since then hypotension has continued and patient has had poor po intake. Became Orthostatic over weekend and Midodrine was trialed with no improvement so presented to clinic in which lying pressure was 120s and standing dropped into 80s. He was referred to hospital at that time for further workup and treatment. Workup negative for electrolyte derangement. BP is improved with IVF Bolus 3/14 and hydration overnight. This am patient reports that he does not feel as weak at rest, and was able to eat a good breakfast for the first time in a long time. CT Chest pending from this am. Assisted with walking patient around 1 lap of nurses station (approx 200ft) and Spo2 dropped to 82% and patient became symptomatic (chest pressure, dyspnea, shaking, palpitations) despite 3L o2 via NC. Was escorted back to his room to recover where Spo2 improved back up to 92% on 3L. Ventricular ectopy improved during walk and no other arrhythmias noted. Records from hospital admission course thus far reviewed.       Telemetry Review: SR, period of Bigeminy, Trigeminy, 2 runs of SVT (longest run 9 beats)      PAST MEDICAL HISTORY:    Past Medical History:   Diagnosis Date    Aortic stenosis     Arthritis     Chronic pain     LOWER BACK    GERD (gastroesophageal reflux disease)     Hypertension     Other ill-defined conditions(799.89)     HIGH CHOLESTEROL    Pulmonary fibrosis ([facility] [address]     Stroke ([facility]     TIA         HOME MEDICATIONS:    Prior to Admission Medications   Prescriptions Last Dose Informant Patient Reported? Taking? Omeprazole delayed release (PRILOSEC D/R) 20 mg tablet  Self Yes No   Sig: Take 20 mg by mouth daily. amLODIPine (NORVASC) 5 mg tablet   Yes Yes   Sig: Take 5 mg by mouth daily. aspirin delayed-release 81 mg tablet   Yes Yes   Sig: Take  by mouth daily. atorvastatin (LIPITOR) 40 mg tablet   Yes Yes   Sig: Take  by mouth daily. clopidogreL (PLAVIX) 75 mg tab   Yes Yes   Sig: Take  by mouth. Facility-Administered Medications: None         ALLERGIES:  No Known Allergies      FAMILY HISTORY:    Family History   Problem Relation Age of Onset    Stroke Mother     Cancer Father         LEUKEMIA    Cancer Sister         BRAIN TUMOR    COPD Sister     Cancer Sister         KIDNEY         SOCIAL HISTORY:    Tobacco: Former (40 year pack history)  Drugs: denies  ETOH: occasionally on weekends. REVIEW OF SYSTEMS:  Complete review of systems performed, pertinents noted above, all other systems are negative.     Patient Vitals for the past 24 hrs:   Temp Pulse Resp BP SpO2   03/15/22 0748  75      03/15/22 0744     98 %   03/15/22 0716 98.5 °F (36.9 °C) (!) 58 20 (!) 116/52 100 %   03/15/22 0420 97.6 °F (36.4 °C) 61 20 (!) 97/56 100 %   03/15/22 0400  61      03/15/22 0015 98 °F (36.7 °C) 62 20 113/66 100 %   03/14/22 2001 98.8 °F (37.1 °C) 64 20 138/68 100 %   03/14/22 1957     100 %   03/14/22 1956     100 %   03/14/22 1614 98.5 °F (36.9 °C) 79  128/65 100 %   03/14/22 1600  73      03/14/22 1528     100 %   03/14/22 1302 97.3 °F (36.3 °C) 73 18 (!) 119/53 97 %       PHYSICAL EXAMINATION:    General: Chronically ill appearing male that has had significant clinical decline over past 3 months, NAD at rest, A&O  HEENT: Normocephalic, PERRL, no drainage, glasses on, hearing aids in  Neck: Supple, Trachea midline, No JVD  RESP: Coarse/clear, occasional crackle consistent with PF. Symmetrical chest movement. No distress at rest, but easily exertional even with talking. On 3L o2 via NC. Cardiovascular: RRR no RG. Mild systolic murmur. PVS: No rubor, cyanosis, no edema, Radial, DP, PT pulses equal bilaterally  ABD: flat , soft NT, Normoactive BS  Derm: Warm/Dry/Intact with no lesions, normal turgor  Neuro: A&O PPTS, cranial nerves II- XII grossly intact via interaction with patient. No focal deficits  PSYCH: No agitation. Easily anxious. Recent labs results and imaging reviewed.       Recent Results (from the past 24 hour(s))   EKG, 12 LEAD, INITIAL    Collection Time: 03/14/22 12:18 PM   Result Value Ref Range    Ventricular Rate 53 BPM    Atrial Rate 52 BPM    P-R Interval 230 ms    QRS Duration 112 ms    Q-T Interval 499 ms    QTC Calculation (Bezet) 469 ms    Calculated P Axis 38 degrees    Calculated R Axis 77 degrees    Calculated T Axis 45 degrees    Diagnosis       Sinus rhythm  Prolonged AK interval  Incomplete left bundle branch block  Probable left ventricular hypertrophy     BLOOD GAS, ARTERIAL    Collection Time: 03/14/22  1:35 PM   Result Value Ref Range    pH 7.41 7.35 - 7.45      PCO2 35 35 - 45 mmHg    PO2 117 (H) 70 - 100 mmHg    BICARBONATE 22 22 - 26 mmol/L    BASE DEFICIT 1.9 0 - 2 mmol/L    O2 METHOD Nasal Cannula      O2 FLOW RATE 3 L/min    FIO2 32.0 %    Sample source Arterial      SITE Right Brachial      MARCIN'S TEST Not Applicable      Carboxy-Hgb 0.3 0 - 5 %    Methemoglobin 0.3 0 - 5 %    tHb 14.5 13.5 - 17.5 g/dL    Oxyhemoglobin 98.0 95 - 220 %   METABOLIC PANEL, COMPREHENSIVE    Collection Time: 03/14/22  1:56 PM   Result Value Ref Range    Sodium 142 136 - 145 mmol/L    Potassium 4.6 3.5 - 5.1 mmol/L    Chloride 107 97 - 108 mmol/L    CO2 26 21 - 32 mmol/L    Anion gap 9 5 - 15 mmol/L    Glucose 104 (H) 65 - 100 mg/dL    BUN 21 (H) 6 - 20 mg/dL    Creatinine 1.43 (H) 0.70 - 1.30 mg/dL    BUN/Creatinine ratio 15 12 - 20      GFR est AA 58 (L) >60 ml/min/1.73m2    GFR est non-AA 48 (L) >60 ml/min/1.73m2    Calcium 8.6 8.5 - 10.1 mg/dL    Bilirubin, total 0.6 0.2 - 1.0 mg/dL    AST (SGOT) 17 15 - 37 U/L    ALT (SGPT) 21 12 - 78 U/L    Alk. phosphatase 88 45 - 117 U/L    Protein, total 6.5 6.4 - 8.2 g/dL    Albumin 3.0 (L) 3.5 - 5.0 g/dL    Globulin 3.5 2.0 - 4.0 g/dL    A-G Ratio 0.9 (L) 1.1 - 2.2     CBC WITH AUTOMATED DIFF    Collection Time: 03/14/22  1:56 PM   Result Value Ref Range    WBC 9.9 4.4 - 11.3 K/uL    RBC 4.65 4.50 - 5.90 M/uL    HGB 13.5 13.5 - 17.5 g/dL    HCT 41.4 41 - 53 %    MCV 89.0 80 - 100 FL    MCH 29.1 (L) 31 - 34 PG    MCHC 32.7 31.0 - 36.0 g/dL    RDW 15.8 (H) 11.5 - 14.5 %    PLATELET 752 622 - 967 K/uL    MPV 8.2 6.5 - 11.5 FL    NRBC 0.0  WBC    ABSOLUTE NRBC 0.00 K/uL    NEUTROPHILS 66 42 - 75 %    LYMPHOCYTES 20 (L) 20.5 - 51.1 %    MONOCYTES 8 1.7 - 9.3 %    EOSINOPHILS 5 (H) 0.9 - 2.9 %    BASOPHILS 1 0.0 - 2.5 %    ABS. NEUTROPHILS 6.6 1.8 - 7.7 K/UL    ABS. LYMPHOCYTES 2.0 1.0 - 4.8 K/UL    ABS. MONOCYTES 0.8 0.2 - 2.4 K/UL    ABS. EOSINOPHILS 0.5 0.0 - 0.7 K/UL    ABS.  BASOPHILS 0.0 0.0 - 0.2 K/UL   MAGNESIUM    Collection Time: 03/14/22  1:56 PM   Result Value Ref Range    Magnesium 2.1 1.6 - 2.4 mg/dL   NT-PRO BNP    Collection Time: 03/14/22  1:56 PM   Result Value Ref Range    NT pro-BNP 3,950 (H) <450 pg/mL   URINALYSIS W/ REFLEX CULTURE    Collection Time: 03/14/22  3:10 PM    Specimen: Urine   Result Value Ref Range    Color Yellow/Straw      Appearance Clear Clear      Specific gravity 1.015 1.003 - 1.030      pH (UA) 6.0 5.0 - 8.0      Protein Negative Negative mg/dL    Glucose Negative Negative mg/dL    Ketone Negative Negative mg/dL    Bilirubin Negative Negative      Blood Negative Negative      Urobilinogen 0.2 0.2 - 1.0 EU/dL    Nitrites Negative Negative      Leukocyte Esterase Moderate (A) Negative      WBC 5-10 0 - 5 /hpf    RBC 0-5 0 - 3 /hpf    Bacteria Negative Negative /hpf    UA:UC IF INDICATED Culture not indicated by UA result Culture not indicated by UA result     TSH 3RD GENERATION    Collection Time: 03/14/22  3:50 PM   Result Value Ref Range    TSH 2.53 0.36 - 3.74 uIU/mL   D DIMER    Collection Time: 03/15/22  4:54 AM   Result Value Ref Range    D DIMER 0.44 <0.50 ug/ml(FEU)   METABOLIC PANEL, BASIC    Collection Time: 03/15/22  4:54 AM   Result Value Ref Range    Sodium 141 136 - 145 mmol/L    Potassium 4.6 3.5 - 5.1 mmol/L    Chloride 108 97 - 108 mmol/L    CO2 24 21 - 32 mmol/L    Anion gap 9 5 - 15 mmol/L    Glucose 142 (H) 65 - 100 mg/dL    BUN 19 6 - 20 mg/dL    Creatinine 1.38 (H) 0.70 - 1.30 mg/dL    BUN/Creatinine ratio 14 12 - 20      GFR est AA >60 >60 ml/min/1.73m2    GFR est non-AA 50 (L) >60 ml/min/1.73m2    Calcium 8.6 8.5 - 10.1 mg/dL   CBC WITH AUTOMATED DIFF    Collection Time: 03/15/22  4:54 AM   Result Value Ref Range    WBC 9.3 4.4 - 11.3 K/uL    RBC 4.27 (L) 4.50 - 5.90 M/uL    HGB 12.4 (L) 13.5 - 17.5 g/dL    HCT 38.1 (L) 41 - 53 %    MCV 89.2 80 - 100 FL    MCH 28.9 (L) 31 - 34 PG    MCHC 32.5 31.0 - 36.0 g/dL    RDW 15.7 (H) 11.5 - 14.5 %    PLATELET 770 263 - 478 K/uL    MPV 9.0 6.5 - 11.5 FL    NRBC 0.0  WBC    ABSOLUTE NRBC 0.00 K/uL    NEUTROPHILS 84 (H) 42 - 75 %    LYMPHOCYTES 13 (L) 20.5 - 51.1 %    MONOCYTES 3 1.7 - 9.3 %    EOSINOPHILS 0 (L) 0.9 - 2.9 %    BASOPHILS 0 0.0 - 2.5 %    ABS. NEUTROPHILS 7.7 1.8 - 7.7 K/UL    ABS. LYMPHOCYTES 1.2 1.0 - 4.8 K/UL    ABS. MONOCYTES 0.3 0.2 - 2.4 K/UL    ABS. EOSINOPHILS 0.0 0.0 - 0.7 K/UL    ABS.  BASOPHILS 0.0 0.0 - 0.2 K/UL Current Facility-Administered Medications:     dilTIAZem ER (CARDIZEM CD) capsule 120 mg, 120 mg, Oral, DAILY, Cleve Burleson MD    midodrine (PROAMATINE) tablet 10 mg, 10 mg, Oral, TID WITH MEALS, Cleve Burleson MD, 10 mg at 03/15/22 0950    albuterol-ipratropium (DUO-NEB) 2.5 MG-0.5 MG/3 ML, 3 mL, Nebulization, QID RT, Cleve Burleson MD, 3 mL at 03/15/22 0744    sodium chloride (NS) flush 5-40 mL, 5-40 mL, IntraVENous, Q8H, Cleve Burleson MD    sodium chloride (NS) flush 5-40 mL, 5-40 mL, IntraVENous, PRN, Felipe Burleson MD    acetaminophen (TYLENOL) tablet 650 mg, 650 mg, Oral, Q6H PRN **OR** acetaminophen (TYLENOL) suppository 650 mg, 650 mg, Rectal, Q6H PRN, Cleve Burleson MD    polyethylene glycol (MIRALAX) packet 17 g, 17 g, Oral, DAILY PRN, Cleve Burleson MD    ondansetron (ZOFRAN ODT) tablet 4 mg, 4 mg, Oral, Q8H PRN **OR** ondansetron (ZOFRAN) injection 4 mg, 4 mg, IntraVENous, Q6H PRN, Cleve Burleson MD    enoxaparin (LOVENOX) injection 40 mg, 40 mg, SubCUTAneous, DAILY, Cleve Burleson MD, 40 mg at 03/15/22 5838    aspirin delayed-release tablet 81 mg, 81 mg, Oral, DAILY, Cleve Burleson MD, 81 mg at 03/15/22 0832    clopidogreL (PLAVIX) tablet 75 mg, 75 mg, Oral, DAILY, Cleve Burleson MD, 75 mg at 03/15/22 0832    pantoprazole (PROTONIX) tablet 40 mg, 40 mg, Oral, ACB, Cleve Burleson MD, 40 mg at 03/15/22 0729    atorvastatin (LIPITOR) tablet 40 mg, 40 mg, Oral, DAILY, Cleve Burleson MD, 40 mg at 03/15/22 0832    0.9% sodium chloride infusion, 75 mL/hr, IntraVENous, CONTINUOUS, Cleve Burleson MD, Last Rate: 75 mL/hr at 03/15/22 0500, 75 mL/hr at 03/15/22 0500    methylPREDNISolone (PF) (Solu-MEDROL) injection 60 mg, 60 mg, IntraVENous, Q12H, Cleve Burleson MD, 60 mg at 03/15/22 0165          Case discussed with collaborating physician Dr. Gaby Alonso and our impression and recommendations are as follows: 1. Dyspnea: Worsening Severe Pulmonary Hypertension and hypoxia.  team to call established [facility] Pulmonologist for input. Goals of Care discussion to be had thereafter. Prognosis is extremely poor. Ct chest pending. 2. HTN - Hypotensive of recent with +orthostatics. Remains mildly orthostatic this am despite IVF overnight. Will start Midodrine. Encourage compression stockings as well. Attempt to control ectopy. 3. CAD: s/p PCI LCx 11/2019. Cath was repeated 2/23/2022 given increase in dyspnea and showed patent stent and 50% lesion RCA that was not amenable to IFR s/t tortuosity of vessel. Determined sx are pulmonary  and not cardiac. Continue DAPT and statin. 4. Mild AV Stenosis - While recent TTE showed mild to moderate stenosis, gradients were obtained with Cath 2/23/22 and are consistent with mild disease. Continue to monitor and medical management as above. 5. Ventricular Ectopy:  Patient is symptomatic with ectopy, however this is hypoxia and pulmonary driven. Will trial Midodrine. If BP amenable thereafter can try Cardizem PO to attempt to control ectopy to see if improves sx. BB previously improved ectopy burden, however dropped patient BP and he was unable to tolerate. Continue telemetry monitoring, Keep electrolytes at goal. Call with excessive Hrs <50, >120. Thank you for involving us in the care of this patient. Please do not hesitate to call if additional questions arise.  If after hours please call [phone]

## 2022-03-15 NOTE — PROGRESS NOTES
Order for high flow oxygen concentrator and nebulizer sent to [facility] in [facility].

## 2022-03-15 NOTE — PROGRESS NOTES
Progress Note  Date:3/15/2022       Room:205/01  Patient Tami Nurse     YOB: 1940     Age:81 y.o. Subjective    As per admitting provider on 3/14:  Leatha Lee is a 80 y.o. male followed by Jamie Monaco MD and Cardiology Wilmar Davis NP and Pulmonologist Dr. Bobbi Hernandez has a past medical history of Aortic stenosis, Arthritis, Chronic pain, CAD with stents x 2, GERD (gastroesophageal reflux disease), Hypertension, Pulmonary fibrosis (HCC),copd/emphysema and Stroke ([facility] [address] Presents as direct admit from [facility] for orthostatic hypotension. Patient was seen last Friday with hypotension and attempted on Midodrine but patient said it made him have urinary retention. Patient has recently been started on O2 at [facility] after testing that was done by Dr. Dawood Lobo. Patient while at rest does well on the currently 3L NC but with any minimal exertion patient notes extreme sob, dizziness and chest tightness. During this same time frame of 2-3 months patient notes weight loss of about 15lbs and decreased apepitie. Because of noted dyspnea, cardiology did echo in January which showed preserved EF and cardiac catherization showed current stents to be patent and no new blockages noted. Patient also had recent holter monitor which noted at times sinus marcelino with HR as low as 42 but also frequent ectopy and patient was noted to be symptomtatic and was started on Metoprolol dosing which helped but had to be stopped because of hypotension. Patient denies any fevers, cough or sick contacts. Denies chest pain but does complain of chest tightness with exertion. No obvious evidence of fluid overload but BNP was elevated at 3,950. cxr is pending results at this time. Orthostatic vital signs were positive at cardiology office with blood pressure going from 120/60 to 104/60 and 84/60 standing.    On arrival to the medical floor ABG was performed on 3L NC and showed good Po2 of 117. And repeat orthostatics on arrival was Lying /67 HR 83, Sitting BP 97/56 HR 69  Standing /55 HR68 with no complaint of dizziness or chest pain. Prior to labs, ordered 1 L NS bolus dosing. Labs were similar to most recent check on 11/20/2021. With patient presentation, patient was referred for admission for orthostatic hypotension, weight loss, pulmonary fibrosis exacerbation, chronic hypoxic respiratory failure    3/15: patient seen on follow up. Resting in bed . Still unable to lay completely flat. Had extensive ectopy noted on telemetry. Orthostatics mild present but no dizziness noted. Has good o2 saturation at rest on 3L NC but will need evaluation of o2 need on exertion. Will discuss with patient pulmonolgist for recommendations. Cardiology consult appreciated and restart midodrine and low dose of cardizem cd 120 and possible switch to amiodarone 200mg oral daily on discharge. Review of Systems   Constitutional: Negative for chills, diaphoresis, fatigue and fever. HENT: Negative for congestion, ear pain, postnasal drip, sinus pain and sore throat. Eyes: Negative for pain, discharge and redness. Respiratory: Negative for cough, shortness of breath and wheezing. Cardiovascular: Negative for chest pain and palpitations. Gastrointestinal: Negative for abdominal pain, constipation, diarrhea, nausea and vomiting. Genitourinary: Negative for dysuria, flank pain, frequency and urgency. Musculoskeletal: Negative for arthralgias and myalgias. Neurological: Negative for dizziness, weakness and headaches. Psychiatric/Behavioral: Negative for agitation and hallucinations. The patient is not nervous/anxious.         Objective           Vitals Last 24 Hours:  Patient Vitals for the past 24 hrs:   Temp Pulse Resp BP SpO2   03/15/22 0420 97.6 °F (36.4 °C) 61 20 (!) 97/56 100 %   03/15/22 0400  61      03/15/22 0015 98 °F (36.7 °C) 62 20 113/66 100 %   03/14/22 2001 98.8 °F (37.1 °C) 64 20 138/68 100 %   03/14/22 1957     100 %   03/14/22 1956     100 %   03/14/22 1614 98.5 °F (36.9 °C) 79  128/65 100 %   03/14/22 1600  73      03/14/22 1528     100 %   03/14/22 1302 97.3 °F (36.3 °C) 73 18 (!) 119/53 97 %        I/O (24Hr): Intake/Output Summary (Last 24 hours) at 3/15/2022 0714  Last data filed at 3/15/2022 4840  Gross per 24 hour   Intake 2689.5 ml   Output 650 ml   Net 2039.5 ml       Physical Exam:  General: Alert, cooperative, no distress, appears stated age. Head:  Normocephalic, without obvious abnormality, atraumatic. Eyes:  Conjunctivae/corneas clear. Pupils equal, round, reactive to light. Extraocular movements intact. Lungs:    Chest wall: No tenderness or deformity. Heart:  Regular rate and rhythm, S1, S2 normal, no murmur, click, rub or gallop. Abdomen:  Soft, non-tender. Bowel sounds normal. No masses,  No organomegaly. Extremities: Extremities normal, atraumatic, no cyanosis or edema. Pulses: 2+ and symmetric all extremities. Skin: Skin color, texture, turgor normal. No rashes or lesions  Neurologic: Awake, Alert, oriented.  No obvious gross sensory or motor deficits      Medications           Current Facility-Administered Medications   Medication Dose Route Frequency    albuterol-ipratropium (DUO-NEB) 2.5 MG-0.5 MG/3 ML  3 mL Nebulization QID RT    sodium chloride (NS) flush 5-40 mL  5-40 mL IntraVENous Q8H    sodium chloride (NS) flush 5-40 mL  5-40 mL IntraVENous PRN    acetaminophen (TYLENOL) tablet 650 mg  650 mg Oral Q6H PRN    Or    acetaminophen (TYLENOL) suppository 650 mg  650 mg Rectal Q6H PRN    polyethylene glycol (MIRALAX) packet 17 g  17 g Oral DAILY PRN    ondansetron (ZOFRAN ODT) tablet 4 mg  4 mg Oral Q8H PRN    Or    ondansetron (ZOFRAN) injection 4 mg  4 mg IntraVENous Q6H PRN    enoxaparin (LOVENOX) injection 40 mg  40 mg SubCUTAneous DAILY    aspirin delayed-release tablet 81 mg  81 mg Oral DAILY    clopidogreL (PLAVIX) tablet 75 mg  75 mg Oral DAILY    pantoprazole (PROTONIX) tablet 40 mg  40 mg Oral ACB    atorvastatin (LIPITOR) tablet 40 mg  40 mg Oral DAILY    0.9% sodium chloride infusion  75 mL/hr IntraVENous CONTINUOUS    methylPREDNISolone (PF) (Solu-MEDROL) injection 60 mg  60 mg IntraVENous Q12H         Allergies         Patient has no known allergies. Labs/Imaging/Diagnostics      Labs:  Recent Results (from the past 48 hour(s))   BLOOD GAS, ARTERIAL    Collection Time: 03/14/22  1:35 PM   Result Value Ref Range    pH 7.41 7.35 - 7.45      PCO2 35 35 - 45 mmHg    PO2 117 (H) 70 - 100 mmHg    BICARBONATE 22 22 - 26 mmol/L    BASE DEFICIT 1.9 0 - 2 mmol/L    O2 METHOD Nasal Cannula      O2 FLOW RATE 3 L/min    FIO2 32.0 %    Sample source Arterial      SITE Right Brachial      MARCIN'S TEST Not Applicable      Carboxy-Hgb 0.3 0 - 5 %    Methemoglobin 0.3 0 - 5 %    tHb 14.5 13.5 - 17.5 g/dL    Oxyhemoglobin 98.0 95 - 432 %   METABOLIC PANEL, COMPREHENSIVE    Collection Time: 03/14/22  1:56 PM   Result Value Ref Range    Sodium 142 136 - 145 mmol/L    Potassium 4.6 3.5 - 5.1 mmol/L    Chloride 107 97 - 108 mmol/L    CO2 26 21 - 32 mmol/L    Anion gap 9 5 - 15 mmol/L    Glucose 104 (H) 65 - 100 mg/dL    BUN 21 (H) 6 - 20 mg/dL    Creatinine 1.43 (H) 0.70 - 1.30 mg/dL    BUN/Creatinine ratio 15 12 - 20      GFR est AA 58 (L) >60 ml/min/1.73m2    GFR est non-AA 48 (L) >60 ml/min/1.73m2    Calcium 8.6 8.5 - 10.1 mg/dL    Bilirubin, total 0.6 0.2 - 1.0 mg/dL    AST (SGOT) 17 15 - 37 U/L    ALT (SGPT) 21 12 - 78 U/L    Alk.  phosphatase 88 45 - 117 U/L    Protein, total 6.5 6.4 - 8.2 g/dL    Albumin 3.0 (L) 3.5 - 5.0 g/dL    Globulin 3.5 2.0 - 4.0 g/dL    A-G Ratio 0.9 (L) 1.1 - 2.2     CBC WITH AUTOMATED DIFF    Collection Time: 03/14/22  1:56 PM   Result Value Ref Range    WBC 9.9 4.4 - 11.3 K/uL    RBC 4.65 4.50 - 5.90 M/uL    HGB 13.5 13.5 - 17.5 g/dL    HCT 41.4 41 - 53 %    MCV 89.0 80 - 100 FL    MCH 29.1 (L) 31 - 34 PG    MCHC 32.7 31.0 - 36.0 g/dL    RDW 15.8 (H) 11.5 - 14.5 %    PLATELET 388 329 - 440 K/uL    MPV 8.2 6.5 - 11.5 FL    NRBC 0.0  WBC    ABSOLUTE NRBC 0.00 K/uL    NEUTROPHILS 66 42 - 75 %    LYMPHOCYTES 20 (L) 20.5 - 51.1 %    MONOCYTES 8 1.7 - 9.3 %    EOSINOPHILS 5 (H) 0.9 - 2.9 %    BASOPHILS 1 0.0 - 2.5 %    ABS. NEUTROPHILS 6.6 1.8 - 7.7 K/UL    ABS. LYMPHOCYTES 2.0 1.0 - 4.8 K/UL    ABS. MONOCYTES 0.8 0.2 - 2.4 K/UL    ABS. EOSINOPHILS 0.5 0.0 - 0.7 K/UL    ABS.  BASOPHILS 0.0 0.0 - 0.2 K/UL   MAGNESIUM    Collection Time: 03/14/22  1:56 PM   Result Value Ref Range    Magnesium 2.1 1.6 - 2.4 mg/dL   NT-PRO BNP    Collection Time: 03/14/22  1:56 PM   Result Value Ref Range    NT pro-BNP 3,950 (H) <450 pg/mL   URINALYSIS W/ REFLEX CULTURE    Collection Time: 03/14/22  3:10 PM    Specimen: Urine   Result Value Ref Range    Color Yellow/Straw      Appearance Clear Clear      Specific gravity 1.015 1.003 - 1.030      pH (UA) 6.0 5.0 - 8.0      Protein Negative Negative mg/dL    Glucose Negative Negative mg/dL    Ketone Negative Negative mg/dL    Bilirubin Negative Negative      Blood Negative Negative      Urobilinogen 0.2 0.2 - 1.0 EU/dL    Nitrites Negative Negative      Leukocyte Esterase Moderate (A) Negative      WBC 5-10 0 - 5 /hpf    RBC 0-5 0 - 3 /hpf    Bacteria Negative Negative /hpf    UA:UC IF INDICATED Culture not indicated by UA result Culture not indicated by UA result     TSH 3RD GENERATION    Collection Time: 03/14/22  3:50 PM   Result Value Ref Range    TSH 2.53 0.36 - 3.74 uIU/mL   D DIMER    Collection Time: 03/15/22  4:54 AM   Result Value Ref Range    D DIMER 0.44 <0.50 ug/ml(FEU)   METABOLIC PANEL, BASIC    Collection Time: 03/15/22  4:54 AM   Result Value Ref Range    Sodium 141 136 - 145 mmol/L    Potassium 4.6 3.5 - 5.1 mmol/L    Chloride 108 97 - 108 mmol/L    CO2 24 21 - 32 mmol/L    Anion gap 9 5 - 15 mmol/L    Glucose 142 (H) 65 - 100 mg/dL    BUN 19 6 - 20 mg/dL Creatinine 1.38 (H) 0.70 - 1.30 mg/dL    BUN/Creatinine ratio 14 12 - 20      GFR est AA >60 >60 ml/min/1.73m2    GFR est non-AA 50 (L) >60 ml/min/1.73m2    Calcium 8.6 8.5 - 10.1 mg/dL   CBC WITH AUTOMATED DIFF    Collection Time: 03/15/22  4:54 AM   Result Value Ref Range    WBC 9.3 4.4 - 11.3 K/uL    RBC 4.27 (L) 4.50 - 5.90 M/uL    HGB 12.4 (L) 13.5 - 17.5 g/dL    HCT 38.1 (L) 41 - 53 %    MCV 89.2 80 - 100 FL    MCH 28.9 (L) 31 - 34 PG    MCHC 32.5 31.0 - 36.0 g/dL    RDW 15.7 (H) 11.5 - 14.5 %    PLATELET 019 562 - 452 K/uL    MPV 9.0 6.5 - 11.5 FL    NRBC 0.0  WBC    ABSOLUTE NRBC 0.00 K/uL    NEUTROPHILS 84 (H) 42 - 75 %    LYMPHOCYTES 13 (L) 20.5 - 51.1 %    MONOCYTES 3 1.7 - 9.3 %    EOSINOPHILS 0 (L) 0.9 - 2.9 %    BASOPHILS 0 0.0 - 2.5 %    ABS. NEUTROPHILS 7.7 1.8 - 7.7 K/UL    ABS. LYMPHOCYTES 1.2 1.0 - 4.8 K/UL    ABS. MONOCYTES 0.3 0.2 - 2.4 K/UL    ABS. EOSINOPHILS 0.0 0.0 - 0.7 K/UL    ABS. BASOPHILS 0.0 0.0 - 0.2 K/UL        Imaging:  No results found.      Assessment//Plan           Problem List:  Hospital Problems  Never Reviewed          Codes Class Noted POA    * (Principal) Orthostatic hypotension ICD-10-CM: I95.1  ICD-9-CM: 458.0  3/14/2022 Unknown        Dehydration ICD-10-CM: E86.0  ICD-9-CM: 276.51  3/14/2022 Unknown        Pulmonary fibrosis ([facility] [address] ICD-10-CM: J84.10  ICD-9-CM: 515  3/14/2022 Unknown        Chronic respiratory failure with hypoxia [facility]) ICD-10-CM: J96.11  ICD-9-CM: 518.83, 799.02  3/14/2022 Unknown              Orthostatic Hypotension   - was positive at cardiology office and felt to be secondary to poor po intake / dehydration / weight loss   - monitor daily orthostatic pressures and vital signs every 4 hours  -Monitor on telemetry for evidences of ectopy / episodes of bradycardia  -1 L fluid bolus of normal saline and will continue normal saline at 75 an hour  -Initial BUN/creatinine 21/1.43 and follow closely while on IV fluids   -Mag level is normal  -No evidence of fluid overload but BNP is at 3950 and most recent echo shows preserved EF   -Fall precautions  -TSH in normal range of 2.53 (previously was elevated on 11/30/21 at 4.26)   -Recent 2D echo in January 2022 normal left ventricular size with preserved EF of 5328% diastolic dysfunction present with normal EF  -Recent cardiac catheterization and no new significant blockages and current stents are patent  -Cardiology evaluation and follow recommendations   - 3/15: mildly orthostatic but no dizziness.  Still extensive ectopy on telemetry and sob with minimal exertion or laying flat      Acute on chronic hypoxic respiratory failure  -Patient recently started on 3 L nasal cannula continuous but continued to have shortness of breath with minimal exertion  -Follows with Dr. Rodrigues Her  pulmonologist in [facility]  -chest xray showing reduced lung volumes with dense peripheral honeycombing reticular markings to the lungs evidence for pulmonary fibrosis  -d dimer on 3/15 at 0.44   - ABG on 3L NC shows a pH of 7.41/CO2 of 35/PO2 of 117/HCO3 22  -Continue DuoNebs 4 times daily  -Continuous bedside O2 monitoring evaluate for any hypoxia while asleep  -We will need walking pulse ox testing prior to discharge  - discuss with pulmonlogist Dr. Shan Walden about patient overall prognosis with noted increased need for o2 at rest and with exertion and any changes in current inhaler      Acute on chronic pulmonary fibrosis exacerbation  -Has history of noted to have decreased air entry but with any exertion gets short of breath and chest tightness but denies any cough  -We will place on DuoNeb 4 times daily  -Start Solu-Medrol 60 mg IV every 12 hours  - chest xray showing reduced lung volumes with dense peripheral honeycombing reticular markings to the lungs evidence for pulmonary fibrosis  -Encourage incentive spirometry  - 3/15: CT scan with IV contrast shows no obvious neoplasm and similar findings to noted CT on pulmonary in June 21 of severe fibrosis. Discuss with pulmonary about any changes to current inhaler and benefit of nebulizer at home and overall prognosis ?     Urinary retention, urgency , frequency  - states he has been having symptoms since midodrine started   - obtain analysis and urine culture  - post void residual evaluation was negative       Dehydration:  -With poor p.o. intake and noted orthostatic hypotension at cardiology office  -15 pound weight loss recently  -Status post 1 L fluid bolus continue normal saline at 75 an hour  -BUN/creatinine of 21/1.43 and repeat on 3/15 at 19/1.38  -Monitor input output and daily weights  -Follow repeat labs in a.m.     Poor p.o. intake/weight loss  -Over last 2-3 months with decreased appetite  -Nutrition consultation for oral nutritional supplements  -Monitor p.o. intake and daily weights     CAD history of stent  -Continue current aspirin Plavix and statin therapy  -Monitor on telemetry has recent Holter shows episodes of ectopy and initially was placed on metoprolol but held secondary to hypotension  -History of recent cardiac catheterization showing patency of current stents and no new blockages  -Echo shows preserved EF     GI Prophylaxis  Continue patient's home PPI     DVT prophylaxis  -Lovenox     CODE STATUS: DNR  Patient designates his daughter Marielena Medina as his medical decision-maker if for some reason he is unable to make decision for himself       Spent 30 minutes evaluting and coordinating patient care of which >50% was spent coordinating and counseling.          Electronically signed by Tamera Grey MD on 3/15/2022 at 7:14 AM

## 2022-03-15 NOTE — PROGRESS NOTES
Discussed patient pulmonary fibrosis with pulmonologist Dr Yoli Mccormick about patient condition and if there is anything else that can be offered for patient worsening interstitial fibrosis. At this time she stated that with current CT findings there is no medical therapy to help with his worsening hypoxia and to test patient and determine what level of oxygen is need at rest and with exertion. RT evaluated the patient and at rest had good o2 saturation of close to 100% on 3L at rest and with exertion patient dropped into low 80% range and it is noted that patient required 6L NC on exertion to maintain o2 saturation at 88 - 90% range. During ambulation, patient was monitored on tele and HR ranged from start at 93 and increased to 99 and was noted to have episodic ectopy but didn't appear to be having any issues with palpitation. With patient Daughter Estevan Santizo present, had extensive discussion with patient about my conversation with pulmonologist and no recommended treatment changes at this time and overall she noted that patient has a poor prognosis and recommended comfort care. Discussed initially and explained to patient about resuscitation and intubation and felt that with his severe pulmonary disease that he would most likely never come off ventilator. Patient was in agreement that with his current lung disease and inability for cure patient agreed to a DNR/DNI status and will have patient sign DNR form. Patient does have increased episodes of anxiety secondary to respiratory distress/hypoxia and did start low-dose Xanax 0.5 mg every 6 hours as needed. Patient also noted that he was not able to sleep overnight and wishes for something to help him sleep so will order trazodone will give it at bedtime.   Discussed with respiratory to do an overnight pulse ox study so that we can determine while patient is sleeping if 3 L of nasal cannula continuous is enough and patient does not have any desaturating events while sleeping. Plan is to monitor patient for additional 24 hours both blood pressure and heart rate and for possible discharge in a.m. tomorrow. With patient requiring increased O2 on exertion discussed with case management and put order for new concentrator for higher O2 output.

## 2022-03-15 NOTE — ROUTINE PROCESS
Bedside shift change report given to pedro slater (oncoming nurse) by Clay napoles(offgoing nurse). Report included the following information Kardex.

## 2022-03-15 NOTE — PROGRESS NOTES
Spiritual Care Assessment/Progress Note  [facility]      NAME: Juan Manzo      MRN: [id number]  AGE: 80 y.o.  SEX: male  Druze Affiliation: Holiness   Language: English     3/15/2022     Total Time (in minutes): 7     Spiritual Assessment begun in [facility] [address] through conversation with:         [x]Patient        [] Family    [] Friend(s)        Reason for Consult: Initial/Spiritual assessment, patient floor     Spiritual beliefs: (Please include comment if needed)     [x] Identifies with a adeline tradition:  Jesenia Stewart      [] Supported by a adeline community:            [] Claims no spiritual orientation:           [] Seeking spiritual identity:                [] Adheres to an individual form of spirituality:           [] Not able to assess:                           Identified resources for coping:      [] Prayer                               [] Music                  [] Guided Imagery     [x] Family/friends                 [] Pet visits     [] Devotional reading                         [] Unknown     [] Other:                                              Interventions offered during this visit: (See comments for more details)    Patient Interventions: Affirmation of emotions/emotional suffering,Affirmation of adeline,Druze beliefs/image of God discussed           Plan of Care:     [x] Support spiritual and/or cultural needs    [] Support AMD and/or advance care planning process      [] Support grieving process   [] Coordinate Rites and/or Rituals    [] Coordination with community clergy   [] No spiritual needs identified at this time   [] Detailed Plan of Care below (See Comments)  [] Make referral to Music Therapy  [] Make referral to Pet Therapy     [] Make referral to Addiction services  [] Make referral to [facility]  [] Make referral to Spiritual Care Partner  [] No future visits requested        [x] Contact Spiritual Care for further referrals     Comments:

## 2022-03-15 NOTE — ROUTINE PROCESS
The pt have been resting at intervals with no c/o being voiced. He is note to have a moist cough at times. Bedside pulse ox in place. He have his O2 on. The pt is c/o of his Iv site being painful. The site was d/tal & was restarted in the lt lower forearm with a #20 abbocath. He tolerated this well.

## 2022-03-15 NOTE — PROGRESS NOTES
Patient ambulated on [employer] to determine level of oxygen requirement with exercise. Saturations at 100% on 3L at rest. [facility] patient a few steps and patient desaturated to 85%. Increased O2 up to 6L to maintain a saturation of 88%. Patient was not struggling and stated he can walk a few more steps. It is noted that patient tolerates O2@ 3L at rest and 6L on ambulation.

## 2022-03-15 NOTE — ROUTINE PROCESS
Report was received from the offgoing nurse Shelby DUTTON. Care was resumed via the incoming nurse [facility] CAMMY LPN. The pt is resting awake in bed with no c/o being voiced at this time. Iv is infusing at 75cc/hr. The site is positional.

## 2022-03-15 NOTE — ROUTINE PROCESS
Pt. Resting quietly in bed voices no concerns o2 at 3 liters nasal cannula contionus pulse ox intact ivf patent infusing. Call bell within reach.

## 2022-03-15 NOTE — ROUTINE PROCESS
The pt have rest fairly well during the night. He continue to have coughing episodes at times. No c/o of any pain have been voiced. O2 on. Iv cont to infuse at 75cc/hr. Respiratory status is unchanged. Pt cont to have bigeminy pvc's.

## 2022-03-15 NOTE — PROGRESS NOTES
Patient ambulated with [email or web address] in place. At rest, saturations at 92%. Patient walked along the hallways and on the way back to his room, patient started shaking and stated his heart is like skipping a beat. Pulse ox saturation reading is 82%, 89 HR. Patient returned to room and laid to bed. Saturations increased to 92% at rest, 92HR. Juan Jose Morse NP present during ambulation.

## 2022-03-16 VITALS
BODY MASS INDEX: 23.85 KG/M2 | DIASTOLIC BLOOD PRESSURE: 59 MMHG | SYSTOLIC BLOOD PRESSURE: 108 MMHG | HEART RATE: 81 BPM | RESPIRATION RATE: 20 BRPM | OXYGEN SATURATION: 93 % | WEIGHT: 161 LBS | HEIGHT: 69 IN | TEMPERATURE: 97.5 F

## 2022-03-16 LAB
ANION GAP SERPL CALC-SCNC: 9 MMOL/L (ref 5–15)
ATRIAL RATE: 52 BPM
BASOPHILS # BLD: 0 K/UL (ref 0–0.2)
BASOPHILS NFR BLD: 0 % (ref 0–2.5)
BUN SERPL-MCNC: 21 MG/DL (ref 6–20)
BUN/CREAT SERPL: 17 (ref 12–20)
CA-I BLD-MCNC: 8.5 MG/DL (ref 8.5–10.1)
CALCULATED P AXIS, ECG09: 38 DEGREES
CALCULATED R AXIS, ECG10: 77 DEGREES
CALCULATED T AXIS, ECG11: 45 DEGREES
CHLORIDE SERPL-SCNC: 110 MMOL/L (ref 97–108)
CO2 SERPL-SCNC: 26 MMOL/L (ref 21–32)
CREAT SERPL-MCNC: 1.26 MG/DL (ref 0.7–1.3)
DIAGNOSIS, 93000: NORMAL
EOSINOPHIL # BLD: 0 K/UL (ref 0–0.7)
EOSINOPHIL NFR BLD: 0 % (ref 0.9–2.9)
ERYTHROCYTE [DISTWIDTH] IN BLOOD BY AUTOMATED COUNT: 16.5 % (ref 11.5–14.5)
GLUCOSE SERPL-MCNC: 117 MG/DL (ref 65–100)
HCT VFR BLD AUTO: 35.5 % (ref 41–53)
HGB BLD-MCNC: 11.5 G/DL (ref 13.5–17.5)
LYMPHOCYTES # BLD: 1.8 K/UL (ref 1–4.8)
LYMPHOCYTES NFR BLD: 14 % (ref 20.5–51.1)
MCH RBC QN AUTO: 28.8 PG (ref 31–34)
MCHC RBC AUTO-ENTMCNC: 32.4 G/DL (ref 31–36)
MCV RBC AUTO: 89 FL (ref 80–100)
MONOCYTES # BLD: 0.9 K/UL (ref 0.2–2.4)
MONOCYTES NFR BLD: 7 % (ref 1.7–9.3)
NEUTS SEG # BLD: 10 K/UL (ref 1.8–7.7)
NEUTS SEG NFR BLD: 79 % (ref 42–75)
NRBC # BLD: 0.01 K/UL
NRBC BLD-RTO: 0.1 PER 100 WBC
P-R INTERVAL, ECG05: 230 MS
PLATELET # BLD AUTO: 172 K/UL (ref 150–400)
PMV BLD AUTO: 8.7 FL (ref 6.5–11.5)
POTASSIUM SERPL-SCNC: 4.5 MMOL/L (ref 3.5–5.1)
Q-T INTERVAL, ECG07: 499 MS
QRS DURATION, ECG06: 112 MS
QTC CALCULATION (BEZET), ECG08: 469 MS
RBC # BLD AUTO: 4 M/UL (ref 4.5–5.9)
SODIUM SERPL-SCNC: 145 MMOL/L (ref 136–145)
VENTRICULAR RATE, ECG03: 53 BPM
WBC # BLD AUTO: 12.7 K/UL (ref 4.4–11.3)

## 2022-03-16 PROCEDURE — 36415 COLL VENOUS BLD VENIPUNCTURE: CPT

## 2022-03-16 PROCEDURE — 74011250637 HC RX REV CODE- 250/637: Performed by: HOSPITALIST

## 2022-03-16 PROCEDURE — 94640 AIRWAY INHALATION TREATMENT: CPT

## 2022-03-16 PROCEDURE — 85025 COMPLETE CBC W/AUTO DIFF WBC: CPT

## 2022-03-16 PROCEDURE — 80048 BASIC METABOLIC PNL TOTAL CA: CPT

## 2022-03-16 PROCEDURE — 74011000250 HC RX REV CODE- 250: Performed by: HOSPITALIST

## 2022-03-16 PROCEDURE — 77010033678 HC OXYGEN DAILY

## 2022-03-16 PROCEDURE — 74011250636 HC RX REV CODE- 250/636: Performed by: HOSPITALIST

## 2022-03-16 PROCEDURE — 94761 N-INVAS EAR/PLS OXIMETRY MLT: CPT

## 2022-03-16 RX ORDER — MIDODRINE HYDROCHLORIDE 5 MG/1
10 TABLET ORAL
Status: DISCONTINUED | OUTPATIENT
Start: 2022-03-16 | End: 2022-03-16 | Stop reason: HOSPADM

## 2022-03-16 RX ORDER — ALPRAZOLAM 0.5 MG/1
0.5 TABLET ORAL
Qty: 30 TABLET | Refills: 0 | Status: SHIPPED | OUTPATIENT
Start: 2022-03-16

## 2022-03-16 RX ORDER — MIDODRINE HYDROCHLORIDE 5 MG/1
5 TABLET ORAL
Status: SHIPPED | COMMUNITY
Start: 2022-03-16

## 2022-03-16 RX ORDER — IPRATROPIUM BROMIDE AND ALBUTEROL SULFATE 2.5; .5 MG/3ML; MG/3ML
3 SOLUTION RESPIRATORY (INHALATION) 4 TIMES DAILY
Qty: 30 NEBULE | Refills: 1 | Status: SHIPPED | OUTPATIENT
Start: 2022-03-16

## 2022-03-16 RX ORDER — PREDNISONE 10 MG/1
TABLET ORAL
Qty: 15 TABLET | Refills: 0 | Status: SHIPPED | OUTPATIENT
Start: 2022-03-16 | End: 2022-03-26

## 2022-03-16 RX ADMIN — ATORVASTATIN CALCIUM 40 MG: 40 TABLET, FILM COATED ORAL at 08:30

## 2022-03-16 RX ADMIN — IPRATROPIUM BROMIDE AND ALBUTEROL SULFATE 3 ML: .5; 2.5 SOLUTION RESPIRATORY (INHALATION) at 07:53

## 2022-03-16 RX ADMIN — IPRATROPIUM BROMIDE AND ALBUTEROL SULFATE 3 ML: .5; 2.5 SOLUTION RESPIRATORY (INHALATION) at 11:48

## 2022-03-16 RX ADMIN — ASPIRIN 81 MG: 81 TABLET, COATED ORAL at 08:29

## 2022-03-16 RX ADMIN — ENOXAPARIN SODIUM 40 MG: 100 INJECTION SUBCUTANEOUS at 08:29

## 2022-03-16 RX ADMIN — PANTOPRAZOLE SODIUM 40 MG: 20 TABLET, DELAYED RELEASE ORAL at 07:40

## 2022-03-16 RX ADMIN — MIDODRINE HYDROCHLORIDE 10 MG: 5 TABLET ORAL at 07:40

## 2022-03-16 RX ADMIN — CLOPIDOGREL BISULFATE 75 MG: 75 TABLET ORAL at 08:30

## 2022-03-16 RX ADMIN — METHYLPREDNISOLONE SODIUM SUCCINATE 60 MG: 125 INJECTION, POWDER, FOR SOLUTION INTRAMUSCULAR; INTRAVENOUS at 08:29

## 2022-03-16 NOTE — ROUTINE PROCESS
Bedside shift change report given to pedro slater (oncoming nurse) by Regan walker(offgoing nurse). Report included the following information Kardex.

## 2022-03-16 NOTE — ROUTINE PROCESS
Patient requesting refill of medication.  Medication has been loaded for review.  Please Fax to local pharmacy. Patient will check with pharmacy in 24 to 48 hours  Comments: None     Pt requested medication for nerves, reports \" I need my medication I had earlier today for my nerves\" Medication administered and pt now resting quietly at this time.

## 2022-03-16 NOTE — DISCHARGE INSTRUCTIONS
Continue o2 3L NC at all times at rest and increase to 6L NC with exertion. Only if symptomatic with sob then check o2 and take rest before continuing exertion. Monitor blood pressure daily in AM and randomly if episode of dizziness   Do nebulizer 4 x daily at the start but can use it more frequently every 4 hours if sob or wheezing.    Activity as tolerated  Cardiac diet

## 2022-03-16 NOTE — PROGRESS NOTES
Progress Note    Patient: Chiki Ulloa MRN: [id number]  SSN: [id number]    YOB: 1940  Age: 80 y.o. Sex: male      Admit Date: 3/14/2022    LOS: 2 days     Subjective:     Patient seen and examined. Chiki Ulloa is a 80y.o. year-old male with past medical history significant for HTN, CAD s/p PCI LCx 11/2019, Mild Aortic Stenosis, Emphysema, Severe Pulmonary Fibrosis, and chronic hypoxia who is followed for Ventricular Ectopy, Dyspnea, and Orthostatic Hypotension. Patient reports that he is feeling significantly better this am. No significant change in respiratory status, but denies palpitations (despite dysthymias below). Rested well overnight per his report with assistance from Trazadone and Xanax. Reports hasn't rested that well in \"long time. \"  Does complain of Urgency/frequency urinary sx that start approx 30 minutes after taking Midodrine and would like to stop taking it scheduled. Telemetry Review: Frequent ventricular ectopy, periods of bigeminy, trigeminy. Few short runs of NSVT (longest run 4 beats)    Review of Symptoms:   Review of Systems   Constitutional: Positive for malaise/fatigue. HENT: Negative. Eyes: Negative. Cardiovascular: Positive for dyspnea on exertion and palpitations. Negative for leg swelling. Respiratory: Positive for shortness of breath. Endocrine: Negative. Hematologic/Lymphatic: Negative. Skin: Negative. Musculoskeletal: Positive for muscle weakness. Gastrointestinal: Negative. Genitourinary: Positive for urgency. Neurological: Negative. Psychiatric/Behavioral: The patient is nervous/anxious. Allergic/Immunologic: Negative.           Objective:     Vitals:    03/16/22 0656 03/16/22 0659 03/16/22 0728 03/16/22 0753   BP:  (!) 98/57 (!) 98/57    Pulse:  (!) 54 (!) 54    Resp:  20 20    Temp:  97.5 °F (36.4 °C) 97.5 °F (36.4 °C)    SpO2:  100%  100%   Weight: 73 kg (161 lb)      Height:            Intake and Output:  Current Shift: 03/16 0701 - 03/16 1900  In: 360 [P.O.:360]  Out: 200 [Urine:200]  Last three shifts: 03/14 1901 - 03/16 0700  In: 4078.3 [P.O.:1560; I.V.:2508.3]  Out: 2402 [Urine:2400]    Physical Exam:   General: Chronically ill appearing male, NAD at rest, A&O  HEENT: Normocephalic, PERRL, no drainage, glasses on, hearing aids in  Neck: Supple, Trachea midline, No JVD  RESP: Coarse/clear, occasional crackle consistent with PF. Symmetrical chest movement. No distress at rest, but easily exertional even with talking. On 3L o2 via NC. Cardiovascular: RRR no RG. Mild systolic murmur. PVS: No rubor, cyanosis, no edema, Radial, DP, PT pulses equal bilaterally  ABD: flat , soft NT, Normoactive BS  Derm: Warm/Dry/Intact with no lesions, normal turgor  Neuro: A&O PPTS, cranial nerves II- XII grossly intact via interaction with patient. No focal deficits  PSYCH: No agitation. Easily anxious.       Lab/Data Review:  BMP:   Lab Results   Component Value Date/Time     03/16/2022 05:15 AM    K 4.5 03/16/2022 05:15 AM     (H) 03/16/2022 05:15 AM    CO2 26 03/16/2022 05:15 AM    AGAP 9 03/16/2022 05:15 AM     (H) 03/16/2022 05:15 AM    BUN 21 (H) 03/16/2022 05:15 AM    CREA 1.26 03/16/2022 05:15 AM    GFRAA >60 03/16/2022 05:15 AM    GFRNA 55 (L) 03/16/2022 05:15 AM            Current Facility-Administered Medications:     midodrine (PROAMATINE) tablet 10 mg, 10 mg, Oral, TID WITH MEALS, Cleve Burleson MD, 10 mg at 03/16/22 0740    methylPREDNISolone (PF) (Solu-MEDROL) injection 60 mg, 60 mg, IntraVENous, DAILY, Cleve Burleson MD, 60 mg at 03/16/22 0829    ALPRAZolam (XANAX) tablet 0.5 mg, 0.5 mg, Oral, QID PRN, Cleve Burleson MD, 0.5 mg at 03/15/22 2212    albuterol-ipratropium (DUO-NEB) 2.5 MG-0.5 MG/3 ML, 3 mL, Nebulization, QID RT, Cleve Burleson MD, 3 mL at 03/16/22 7223    sodium chloride (NS) flush 5-40 mL, 5-40 mL, IntraVENous, Q8H, Cleve Burleson MD, 10 mL at 03/15/22 1332   sodium chloride (NS) flush 5-40 mL, 5-40 mL, IntraVENous, PRN, eMredith Burleson MD    acetaminophen (TYLENOL) tablet 650 mg, 650 mg, Oral, Q6H PRN **OR** acetaminophen (TYLENOL) suppository 650 mg, 650 mg, Rectal, Q6H PRN, Cleve Burleson MD    polyethylene glycol (MIRALAX) packet 17 g, 17 g, Oral, DAILY PRN, Cleve Burleson MD    ondansetron (ZOFRAN ODT) tablet 4 mg, 4 mg, Oral, Q8H PRN **OR** ondansetron (ZOFRAN) injection 4 mg, 4 mg, IntraVENous, Q6H PRN, Cleve Burleson MD    enoxaparin (LOVENOX) injection 40 mg, 40 mg, SubCUTAneous, DAILY, Cleve Burleson MD, 40 mg at 03/16/22 0829    aspirin delayed-release tablet 81 mg, 81 mg, Oral, DAILY, Cleve Burleson MD, 81 mg at 03/16/22 0829    clopidogreL (PLAVIX) tablet 75 mg, 75 mg, Oral, DAILY, Cleve Burleson MD, 75 mg at 03/16/22 0830    pantoprazole (PROTONIX) tablet 40 mg, 40 mg, Oral, ACB, Cleve Burleson MD, 40 mg at 03/16/22 0740    atorvastatin (LIPITOR) tablet 40 mg, 40 mg, Oral, DAILY, Cleve Burleson MD, 40 mg at 03/16/22 0830      Assessment:     Principal Problem:    Orthostatic hypotension (3/14/2022)    Active Problems:    Dehydration (3/14/2022)      Pulmonary fibrosis (HCC) (3/14/2022)      Chronic respiratory failure with hypoxia (HCC) (3/14/2022)      Ventricular ectopy (3/15/2022)      CAD (coronary artery disease) (3/15/2022)      Overview: Hx of stents x 2         Plan:     Case discussed with Collaborating physician Dr. Emy Valero and our recommendations are as follows:     1. Dyspnea: Worsening Severe Pulmonary Hypertension and hypoxia.  team spoke with patient's [facility] Pulmonologist 3/15 (see note for specifics). Goals of Care discussion held and patient made DNR 3/15 with focus on comfort, but not ready for Hospice Referral at this time. Is agreeable to home health. Continue supportive measures including 3L o2 via NC @ rest and 6L o2 via NC with exertion.   2. HTN - Hypotensive of recent with +orthostatics. Orthostasis resolved with IVF administration. Per patient request s/t urinary sx will make Midodrine prn going forward. Encourage compression stockings. Hold BB/CCB for now and can be readdressed in OP setting. 3. CAD: s/p PCI LCx 11/2019. Cath was repeated 2/23/2022 given increase in dyspnea and showed patent stent and 50% lesion RCA that was not amenable to IFR s/t tortuosity of vessel. Determined sx are pulmonary  and not cardiac. Continue Plavix and statin. Given overall prognosis and decline and patient wishing to drink occasional beer on weekend will stop ASA @ this time to decrease risk of bleeding or GI irritation. 4. Mild AV Stenosis - While recent TTE showed mild to moderate stenosis, gradients were obtained with Cath 2/23/22 and are consistent with mild disease. Continue to monitor and medical management as above. 5. Ventricular Ectopy:  Patient is NOT symptomatic with ectopy today and reports feeling great despite being in bigeminy on monitor during my visit. Feel this is hypoxia/pulmonary  and anxiety driven. Even with Midodrine Cardizem dropped SBP into 90s yesterday afternoon and patient did not have any change in ectopy burden. Given baseline poor prognosis with life expectancy likely <6-12 months did offer Amiodarone 200mg daily ( no loading dose), but did explain possible side effects including lung toxicity that can occur @ 2-3 months of therapy. Patient does not with to pursue any further medications at this time when going home given he is feeling better and he does not want to take Midodrine. Reports he will see how he does at home and then can further decide from there. This is reasonable given goal of care @ this point is quality of life and comfort and patient understands/agreeable to goal.       Thank you for involving us in the care of this patient. Please do not hesitate to call if additional questions arise.  If after hours please call [phone]    Signed By: Cabrera Wade NP     March 16, 2022

## 2022-03-16 NOTE — ROUTINE PROCESS
Pt. Transferred via w/c to [facility] for transport home by friend.

## 2022-03-16 NOTE — PROGRESS NOTES
Patients case reviewed during interdisciplinary team meeting in [address] [place] [address]/Acute Care Unit. Rev.  Vitaliy Lugo 03, [address]

## 2022-03-16 NOTE — ROUTINE PROCESS
Pt has rested well this shift with VS interruptions as ordered. O2 sats  Remained at 100 throughout the night during rounds.

## 2022-03-16 NOTE — DISCHARGE SUMMARY
Physician Discharge Summary       Patient: Tara Cabral MRN: [id number]     YOB: 1940  Age: 80 y.o. Sex: male    PCP: May Roa MD    Allergies: Patient has no known allergies. Admit date: 3/14/2022  Admitting Provider: Romy Ulloa MD    Discharge date: 3/16/2022  Discharging Provider: Romy Ulloa MD    * Admission Diagnoses:   Orthostatic hypotension [I95.1]  Dehydration [E86.0]  Chronic respiratory failure with hypoxia ([facility] [J96.11]  Pulmonary fibrosis ([facility] [J84.10]      * Discharge Diagnoses:    Hospital Problems as of 3/16/2022 Never Reviewed          Codes Class Noted - Resolved POA    Ventricular ectopy ICD-10-CM: I49.3  ICD-9-CM: 427.69  3/15/2022 - Present Unknown        CAD (coronary artery disease) ICD-10-CM: I25.10  ICD-9-CM: 414.00  3/15/2022 - Present Unknown    Overview Signed 3/15/2022  1:13 PM by Rj Cueto MD     Hx of stents x 2              * (Principal) Orthostatic hypotension ICD-10-CM: I95.1  ICD-9-CM: 458.0  3/14/2022 - Present Unknown        Dehydration ICD-10-CM: E86.0  ICD-9-CM: 276.51  3/14/2022 - Present Unknown        Pulmonary fibrosis ([facility] ICD-10-CM: J84.10  ICD-9-CM: 342  3/14/2022 - Present Unknown        Chronic respiratory failure with hypoxia ([facility] ICD-10-CM: J96.11  ICD-9-CM: 518.83, 799.02  3/14/2022 - Present Unknown                * Hospital Course: As per admitting provider on 3/14:  Fahad Green a 80 y. o. male followed by May Roa MD and Cardiology Chris Bagley NP and Pulmonologist Dr. Rox feng past medical history of Aortic stenosis, Arthritis, Chronic pain, CAD with stents x 2, GERD (gastroesophageal reflux disease), Hypertension, Pulmonary fibrosis (HCC),copd/emphysema and Stroke ([facility] Presents as direct admit from [facility] for orthostatic hypotension.  Patient was seen last Friday with hypotension and attempted on Midodrine but patient said it made him have urinary retention. Patient has recently been started on O2 at [facility] after testing that was done by Dr. Comfort Trujillo. Patient while at rest does well on the currently 3L NC but with any minimal exertion patient notes extreme sob, dizziness and chest tightness. During this same time frame of 2-3 months patient notes weight loss of about 15lbs and decreased apepitie. Because of noted dyspnea, cardiology did echo in January which showed preserved EF and cardiac catherization showed current stents to be patent and no new blockages noted. Patient also had recent holter monitor which noted at times sinus marcelino with HR as low as 42 but also frequent ectopy and patient was noted to be symptomtatic and was started on Metoprolol dosing which helped but had to be stopped because of hypotension. Patient denies any fevers, cough or sick contacts. Denies chest pain but does complain of chest tightness with exertion. No obvious evidence of fluid overload but BNP was elevated at 3,950. cxr is pending results at this time. Orthostatic vital signs were positive at cardiology office with blood pressure going from 120/60 to 104/60 and 84/60 standing. On arrival to the medical floor ABG was performed on 3L NC and showed good Po2 of 117. And repeat orthostatics on arrival was Lying /67 HR 83, Sitting BP 97/56 HR 69  Standing /55 HR68 with no complaint of dizziness or chest pain. Prior to labs, ordered 1 L NS bolus dosing. Labs were similar to most recent check on 11/20/2021.  With patient presentation, patient was referred for admission for orthostatic hypotension, weight loss, pulmonary fibrosis exacerbation, chronic hypoxic respiratory failure       Subjective:           Orthostatic Hypotension   - was positive at cardiology office and felt to be secondary to poor po intake / dehydration / weight loss   - monitor daily orthostatic pressures and vital signs every 4 hours  -Monitor on telemetry for evidences of ectopy / episodes of bradycardia  -1 L fluid bolus of normal saline and will continue normal saline at 75 an hour  -Initial BUN/creatinine 21/1.43 and follow closely while on IV fluids   -Mag level is normal  -No evidence of fluid overload but BNP is at 3950 and most recent echo shows preserved EF   -Fall precautions  -TSH in normal range of 2.53 (previously was elevated on 11/30/21 at 4.26)   -Recent 2D echo in January 2022 normal left ventricular size with preserved EF of 3409% diastolic dysfunction present with normal EF  -Recent cardiac catheterization and no new significant blockages and current stents are patent  -Cardiology evaluation and follow recommendations   -Initially on 3/15 restarted midodrine higher dose of 10 mg 3 times a day but patient was having increasing urinary retention symptoms and also started on Cardizem  at this time patient will go on as needed midodrine dosing and with improvement in hypoxia with increased oxygen.   The patient not having palpitation and decreased activity so no new medications will be continued at this time.     Acute on chronic hypoxic respiratory failure  -Patient recently started on 3 L nasal cannula continuous but continued to have shortness of breath with minimal exertion  -Follows with Dr. Adriane Delarosa in [place]  -chest xray showing reduced lung volumes with dense peripheral honeycombing reticular markings to the lungs evidence for pulmonary fibrosis  -d dimer on 3/15 at 0.44   - ABG on 3L NC shows a pH of 7.41/CO2 of 35/PO2 of 117/HCO3 22  -Continue DuoNebs 4 times daily  -Continuous bedside O2 monitoring evaluate for any hypoxia while asleep  -After evaluation by respiratory therapy patient was continued on 3 L nasal cannula at rest and was ambulated patient noted decrease in O2 sat and requiring 6 L to maintain saturation of 88% so discussed with case management and they will arrange for high flow concentrator to be exchanged with current concentrator.     Acute on chronic pulmonary fibrosis exacerbation  -Has history of noted to have decreased air entry but with any exertion gets short of breath and chest tightness but denies any cough  -We will place on DuoNeb 4 times daily  -Start Solu-Medrol 60 mg IV every 12 hours  - chest xray showing reduced lung volumes with dense peripheral honeycombing reticular markings to the lungs evidence for pulmonary fibrosis  -Encourage incentive spirometry  - 3/15: CT scan with IV contrast shows no obvious neoplasm and similar findings to noted CT on pulmonary in June 21 of severe fibrosis. Discussed patient pulmonary fibrosis with pulmonologist Dr Heidy Walsh about patient condition and if there is anything else that can be offered for patient worsening interstitial fibrosis. At this time she stated that with current CT findings there is no medical therapy to help with his worsening hypoxia and to test patient and determine what level of oxygen is need at rest and with exertion. RT evaluated the patient and at rest had good o2 saturation of close to 100% on 3L at rest and with exertion patient dropped into low 80% range and it is noted that patient required 6L NC on exertion to maintain o2 saturation at 88 - 90% range. During ambulation, patient was monitored on tele and HR ranged from start at 93 and increased to 99 and was noted to have episodic ectopy but didn't appear to be having any issues with palpitation. Overall patient prognosis poor and discussed this with patient with daughter Deborah Rodrigez at bedside and at that time patient confirmed he wished to be a DNR/DNI. With patient's continued hypoxia and underlying anxiety secondary to this start patient on Xanax 0.5 mg tablets taking as needed 3 times a day did take dose yesterday as well as at 10 PM last night and patient stated that he slept well overnight.   RT also did overnight pulse ox study and no significant desaturating events noted so patient should continue on 3 L nasal cannula continuously.   Patient should continue on steroid inhaler as well as we will set up patient with nebulizer using 4 times a day and can be adjusted and should follow-up as scheduled with pulmonologist.  Attempted to change patient's steroid inhaler as Trelegy cost for him $470 for the patient so discussed with patient's pharmacy [facility] and seems at other steroid inhalers had same comment of preferred level 5 limited quantity and pharmacist felt that he could be patient deductible that is the reason why he is having to pay full price for that medication so discussed with patient's daughter Estevan Santizo about calling his insurance and seeing what the deductible may be is once patient meets deductible then medications such as Trelegy would be cheaper but if patient needs alternate can always order depending on what may be covered by his pharmacy coverage     Urinary retention, urgency , frequency  - states he has been having symptoms since midodrine started   -Also shows moderate leukocytes but negative bacteria and urine culture showing 20,000 colonies of gram-negative rods  - post void residual evaluation was negative       Dehydration:  -With poor p.o. intake and noted orthostatic hypotension at cardiology office  -15 pound weight loss recently  -Status post 1 L fluid bolus continue normal saline at 75 an hour  -BUN/creatinine of 21/1.43 and repeat on 3/15 at 19/1.38 and repeat 21/1.26       Poor p.o. intake/weight loss  -Over last 2-3 months with decreased appetite  -Nutrition consultation for oral nutritional supplements  -Monitor p.o. intake and daily weights     CAD history of stent  -Continue current aspirin Plavix and statin therapy  -Monitor on telemetry has recent Holter shows episodes of ectopy and initially was placed on metoprolol but held secondary to hypotension  -History of recent cardiac catheterization showing patency of current stents and no new blockages  -Echo shows preserved EF  - continue to hold beta blocker and not able to tolerate other rate controlling meds to assist with ectopy     * Procedures:   * No surgery found *        Consults:   Cardiology progress Note 3/16:      Progress Note     Patient: Avel Pierson MRN: [id number]  SSN: [id number]    YOB: 1940  Age: 80 y.o. Sex: male       Admit Date: 3/14/2022    LOS: 2 days      Patient seen and examined. Albino Holm a 80y.o. year-old male with past medical history significant for HTN, CAD s/p PCI LCx 11/2019, Mild Aortic Stenosis, Emphysema, Severe Pulmonary Fibrosis, and chronic hypoxia who is followed for Ventricular Ectopy, Dyspnea, and Orthostatic Hypotension. Patient reports that he is feeling significantly better this am. No significant change in respiratory status, but denies palpitations (despite dysthymias below). Rested well overnight per his report with assistance from Trazadone and Xanax. Reports hasn't rested that well in \"long time. \"  Does complain of Urgency/frequency urinary sx that start approx 30 minutes after taking Midodrine and would like to stop taking it scheduled. Plan:      Case discussed with Collaborating physician Dr. Fariba Bauman and our recommendations are as follows:      1. Dyspnea:  Worsening Severe Pulmonary Hypertension and hypoxia.  HM team spoke with patient's [facility] Pulmonologist 3/15 (see note for specifics).  Goals of Care discussion held and patient made DNR 3/15 with focus on comfort, but not ready for Hospice Referral at this time.  Is agreeable to home health. Continue supportive measures including 3L o2 via NC @ rest and 6L o2 via NC with exertion. 2. HTN - Hypotensive of recent with +orthostatics.  Orthostasis resolved with IVF administration. Per patient request s/t urinary sx will make Midodrine prn going forward.  Encourage compression stockings.  Hold BB/CCB for now and can be readdressed in OP setting. 3. CAD: s/p PCI LCx 11/2019.  Cath was repeated 2/23/2022 given increase in dyspnea and showed patent stent and 50% lesion RCA that was not amenable to IFR s/t tortuosity of vessel.  Determined sx are pulmonary  and not cardiac.  Continue Plavix and statin.  Given overall prognosis and decline and patient wishing to drink occasional beer on weekend will stop ASA @ this time to decrease risk of bleeding or GI irritation. 4. Mild AV Stenosis - While recent TTE showed mild to moderate stenosis, gradients were obtained with Cath 2/23/22 and are consistent with mild disease. Continue to monitor and medical management as above. 5. Ventricular Ectopy:  Patient is NOT symptomatic with ectopy today and reports feeling great despite being in bigeminy on monitor during my visit. Feel this is hypoxia/pulmonary  and anxiety driven.  Even with Midodrine Cardizem dropped SBP into 90s yesterday afternoon and patient did not have any change in ectopy burden. Given baseline poor prognosis with life expectancy likely <6-12 months did offer Amiodarone 200mg daily ( no loading dose), but did explain possible side effects including lung toxicity that can occur @ 2-3 months of therapy. Patient does not with to pursue any further medications at this time when going home given he is feeling better and he does not want to take Midodrine. Reports he will see how he does at home and then can further decide from there. This is reasonable given goal of care @ this point is quality of life and comfort and patient understands/agreeable to goal.         Thank you for involving us in the care of this patient. Please do not hesitate to call if additional questions arise.  If after hours please call [phone]      Vitals Last 24 Hours:  Patient Vitals for the past 24 hrs:   Temp Pulse Resp BP SpO2   03/16/22 1148     93 %   03/16/22 1125 97.5 °F (36.4 °C) 81 20 (!) 108/59 94 %   03/16/22 0753     100 %   03/16/22 0728 97.5 °F (36.4 °C) (!) 54 20 (!) 98/57    03/16/22 9557 97.5 °F (36.4 °C) (!) 54 20 (!) 98/57 100 %   03/16/22 0400 97.5 °F (36.4 °C) (!) 59 21 (!) 105/58 100 %   03/15/22 2334 97.5 °F (36.4 °C) (!) 59 20 114/67 100 %   03/15/22 1945     97 %   03/15/22 1944     97 %   03/15/22 1938     97 %   03/15/22 1929  70  (!) 108/54    03/15/22 1928  69  116/63    03/15/22 1919 98.1 °F (36.7 °C) 72 20 120/65 97 %   03/15/22 1554  85      03/15/22 1531 98.1 °F (36.7 °C) 91 20 (!) 154/80 99 %   03/15/22 1515     100 %        Discharge Exam:  General: Alert, cooperative, no distress, appears stated age. Head:   Normocephalic, without obvious abnormality, atraumatic. Eyes:   Conjunctivae/corneas clear. Pupils equal, round, reactive to light. Extraocular movements intact. Lungs:    Chest wall: No tenderness or deformity. Heart:  Regular rate and rhythm, S1, S2 normal, no murmur, click, rub or gallop. Abdomen:  Soft, non-tender. Bowel sounds normal. No masses,  No organomegaly. Extremities: Extremities normal, atraumatic, no cyanosis or edema. Pulses: 2+ and symmetric all extremities. Skin: Skin color, texture, turgor normal. No rashes or lesions  Neurologic: Awake, Alert, oriented.  No obvious gross sensory or motor deficits    Labs:  Recent Results (from the past 24 hour(s))   METABOLIC PANEL, BASIC    Collection Time: 03/16/22  5:15 AM   Result Value Ref Range    Sodium 145 136 - 145 mmol/L    Potassium 4.5 3.5 - 5.1 mmol/L    Chloride 110 (H) 97 - 108 mmol/L    CO2 26 21 - 32 mmol/L    Anion gap 9 5 - 15 mmol/L    Glucose 117 (H) 65 - 100 mg/dL    BUN 21 (H) 6 - 20 mg/dL    Creatinine 1.26 0.70 - 1.30 mg/dL    BUN/Creatinine ratio 17 12 - 20      GFR est AA >60 >60 ml/min/1.73m2    GFR est non-AA 55 (L) >60 ml/min/1.73m2    Calcium 8.5 8.5 - 10.1 mg/dL   CBC WITH AUTOMATED DIFF    Collection Time: 03/16/22  5:15 AM   Result Value Ref Range    WBC 12.7 (H) 4.4 - 11.3 K/uL    RBC 4.00 (L) 4.50 - 5.90 M/uL    HGB 11.5 (L) 13.5 - 17.5 g/dL    HCT 35.5 (L) 41 - 53 %    MCV 89.0 80 - 100 FL    MCH 28.8 (L) 31 - 34 PG    MCHC 32.4 31.0 - 36.0 g/dL    RDW 16.5 (H) 11.5 - 14.5 %    PLATELET 052 055 - 020 K/uL    MPV 8.7 6.5 - 11.5 FL    NRBC 0.1  WBC    ABSOLUTE NRBC 0.01 K/uL    NEUTROPHILS 79 (H) 42 - 75 %    LYMPHOCYTES 14 (L) 20.5 - 51.1 %    MONOCYTES 7 1.7 - 9.3 %    EOSINOPHILS 0 (L) 0.9 - 2.9 %    BASOPHILS 0 0.0 - 2.5 %    ABS. NEUTROPHILS 10.0 (H) 1.8 - 7.7 K/UL    ABS. LYMPHOCYTES 1.8 1.0 - 4.8 K/UL    ABS. MONOCYTES 0.9 0.2 - 2.4 K/UL    ABS. EOSINOPHILS 0.0 0.0 - 0.7 K/UL    ABS. BASOPHILS 0.0 0.0 - 0.2 K/UL         Imaging:  CT CHEST W CONT    Result Date: 3/15/2022  Severe chronic lung disease. No active findings         * Discharge Condition: improved  * Disposition: Home w/Family      Discharge Medications:  Current Discharge Medication List      START taking these medications    Details   albuterol-ipratropium (DUO-NEB) 2.5 mg-0.5 mg/3 ml nebu 3 mL by Nebulization route four (4) times daily. Qty: 30 Nebule, Refills: 1  Start date: 3/16/2022      ALPRAZolam (XANAX) 0.5 mg tablet Take 1 Tablet by mouth three (3) times daily as needed for Anxiety. Max Daily Amount: 1.5 mg.  Qty: 30 Tablet, Refills: 0  Start date: 3/16/2022    Associated Diagnoses: Chronic respiratory failure with hypoxia (HCC)      predniSONE (DELTASONE) 10 mg tablet Take 20 mg by mouth daily (with breakfast) for 5 days, THEN 10 mg daily (with breakfast) for 5 days. Qty: 15 Tablet, Refills: 0  Start date: 3/16/2022, End date: 3/26/2022         CONTINUE these medications which have CHANGED    Details   midodrine (PROAMATINE) 5 mg tablet Take 1 Tablet by mouth three (3) times daily as needed (for dizziness and hypotension). Start date: 3/16/2022         CONTINUE these medications which have NOT CHANGED    Details   aspirin delayed-release 81 mg tablet Take  by mouth daily. clopidogreL (PLAVIX) 75 mg tab Take 75 mg by mouth daily.       atorvastatin (LIPITOR) 40 mg tablet Take 40 mg by mouth daily. Omeprazole delayed release (PRILOSEC D/R) 20 mg tablet Take 20 mg by mouth daily. STOP taking these medications       amLODIPine (NORVASC) 5 mg tablet Comments:   Reason for Stopping:                 * Follow-up Care/Patient Instructions: Activity: Activity as tolerated  Diet: Cardiac Diet  Patient declined home health at this time and felt his daughter could help him with what he needs so will not order home health on discharge. Follow-up Information     Follow up With Specialties Details Why Contact Info    Tomy Sullivan MD Internal Medicine On 3/18/2022 @ 10:30 [facility] [address] [facility]      [address] Office  On 3/22/2022 @ 3:00 in the [facility]. [facility] [address]  [facility] [address], [place] [facility]  as scheduled already in May or Daugther will get sooner appt after discharge  [address]  [phone]          Spent 45 minutes evaluting and coordinating patient care and discharge home of which >50% was spent coordinating and counseling.      Signed:  Williams Marinelli MD  3/16/2022  12:14 PM

## 2022-03-16 NOTE — PROGRESS NOTES
Problem: Hypotension  Goal: *Blood pressure within specified parameters  Outcome: Progressing Towards Goal     Problem: Hypotension  Goal: *Labs within defined limits  Outcome: Progressing Towards Goal     Problem: General Medical Care Plan  Goal: *Vital signs within specified parameters  Outcome: Progressing Towards Goal     Problem: General Medical Care Plan  Goal: *Absence of infection signs and symptoms  Outcome: Progressing Towards Goal     Problem: General Medical Care Plan  Goal: *Optimal pain control at patient's stated goal  Outcome: Progressing Towards Goal     Problem: General Medical Care Plan  Goal: *Skin integrity maintained  Outcome: Progressing Towards Goal     Problem: General Medical Care Plan  Goal: *Fluid volume balance  Outcome: Progressing Towards Goal     Problem: General Medical Care Plan  Goal: *Anxiety reduced or absent  Outcome: Progressing Towards Goal     Problem: General Medical Care Plan  Goal: *Progressive mobility and function (eg: ADL's)  Outcome: Progressing Towards Goal

## 2022-03-16 NOTE — PROGRESS NOTES
Reason for Admission:  Orthostatic hypotension                   RUR Score: 11- low risk                    Plan for utilizing home health:  No        PCP: First and Last name:  Kesha Guthrie MD     Name of Practice: [facility]   Are you a current patient: Yes/No: yes   Approximate date of last visit: unknown   Can you participate in a virtual visit with your PCP: No                    Current Advanced Directive/Advance Care Plan: DNR      Healthcare Decision Maker:   Click here to complete [employer] including selection of the Healthcare Decision Maker Relationship (ie \"Primary\")             Primary Decision Maker: Tenisha Bustamantekaylyn - Daughter - [phone]                  Transition of Care Plan: Patient will be discharged home with family to follow up outpatient. Patient's daughter is a nurse and a caregiver for the patient. Patient and daughter stated that home health is not needed.

## 2022-03-17 LAB
BACTERIA SPEC CULT: ABNORMAL
COLONY COUNT,CNT: ABNORMAL
COLONY COUNT,CNT: ABNORMAL
SPECIAL REQUESTS,SREQ: ABNORMAL

## 2022-12-01 ENCOUNTER — HOSPITAL ENCOUNTER (OUTPATIENT)
Dept: INFUSION THERAPY | Age: 82
Discharge: HOME | End: 2022-12-01
Payer: MEDICARE

## 2022-12-01 VITALS
DIASTOLIC BLOOD PRESSURE: 71 MMHG | OXYGEN SATURATION: 98 % | HEIGHT: 69 IN | BODY MASS INDEX: 21.09 KG/M2 | RESPIRATION RATE: 18 BRPM | SYSTOLIC BLOOD PRESSURE: 128 MMHG | HEART RATE: 57 BPM | TEMPERATURE: 97.4 F | WEIGHT: 142.4 LBS

## 2022-12-01 LAB
ALBUMIN SERPL-MCNC: 3.1 G/DL (ref 3.5–5)
ALBUMIN/GLOB SERPL: 0.6 {RATIO} (ref 1.1–2.2)
ALP SERPL-CCNC: 91 U/L (ref 45–117)
ALT SERPL-CCNC: 16 U/L (ref 12–78)
ANION GAP SERPL CALC-SCNC: 8 MMOL/L (ref 5–15)
AST SERPL W P-5'-P-CCNC: 16 U/L (ref 15–37)
BASOPHILS # BLD: 0 K/UL (ref 0–0.1)
BASOPHILS NFR BLD: 0 % (ref 0–1)
BILIRUB SERPL-MCNC: 0.8 MG/DL (ref 0.2–1)
BUN SERPL-MCNC: 36 MG/DL (ref 6–20)
BUN/CREAT SERPL: 23 (ref 12–20)
CA-I BLD-MCNC: 9.2 MG/DL (ref 8.5–10.1)
CHLORIDE SERPL-SCNC: 105 MMOL/L (ref 97–108)
CO2 SERPL-SCNC: 28 MMOL/L (ref 21–32)
CREAT SERPL-MCNC: 1.6 MG/DL (ref 0.7–1.3)
DIFFERENTIAL METHOD BLD: ABNORMAL
EOSINOPHIL # BLD: 0.4 K/UL (ref 0–0.4)
EOSINOPHIL NFR BLD: 5 % (ref 0–7)
ERYTHROCYTE [DISTWIDTH] IN BLOOD BY AUTOMATED COUNT: 14 % (ref 11.5–14.5)
GLOBULIN SER CALC-MCNC: 4.8 G/DL (ref 2–4)
GLUCOSE SERPL-MCNC: 105 MG/DL (ref 65–100)
HCT VFR BLD AUTO: 42.1 % (ref 36.6–50.3)
HGB BLD-MCNC: 13.7 G/DL (ref 12.1–17)
IMM GRANULOCYTES # BLD AUTO: 0.1 K/UL (ref 0–0.04)
IMM GRANULOCYTES NFR BLD AUTO: 1 % (ref 0–0.5)
LYMPHOCYTES # BLD: 1.5 K/UL (ref 0.8–3.5)
LYMPHOCYTES NFR BLD: 19 % (ref 12–49)
MAGNESIUM SERPL-MCNC: 1.8 MG/DL (ref 1.6–2.4)
MCH RBC QN AUTO: 28.8 PG (ref 26–34)
MCHC RBC AUTO-ENTMCNC: 32.5 G/DL (ref 30–36.5)
MCV RBC AUTO: 88.6 FL (ref 80–99)
MONOCYTES # BLD: 0.6 K/UL (ref 0–1)
MONOCYTES NFR BLD: 7 % (ref 5–13)
NEUTS SEG # BLD: 5 K/UL (ref 1.8–8)
NEUTS SEG NFR BLD: 68 % (ref 32–75)
NRBC # BLD: 0 K/UL (ref 0–0.01)
NRBC BLD-RTO: 0 PER 100 WBC
PLATELET # BLD AUTO: 253 K/UL (ref 150–400)
PMV BLD AUTO: 10.3 FL (ref 8.9–12.9)
POTASSIUM SERPL-SCNC: 4.4 MMOL/L (ref 3.5–5.1)
PROT SERPL-MCNC: 7.9 G/DL (ref 6.4–8.2)
RBC # BLD AUTO: 4.75 M/UL (ref 4.1–5.7)
SODIUM SERPL-SCNC: 141 MMOL/L (ref 136–145)
WBC # BLD AUTO: 7.5 K/UL (ref 4.1–11.1)

## 2022-12-01 PROCEDURE — 96360 HYDRATION IV INFUSION INIT: CPT

## 2022-12-01 PROCEDURE — 74011250636 HC RX REV CODE- 250/636: Performed by: NURSE PRACTITIONER

## 2022-12-01 PROCEDURE — 80053 COMPREHEN METABOLIC PANEL: CPT

## 2022-12-01 PROCEDURE — 96361 HYDRATE IV INFUSION ADD-ON: CPT

## 2022-12-01 PROCEDURE — 36415 COLL VENOUS BLD VENIPUNCTURE: CPT

## 2022-12-01 PROCEDURE — 74011000258 HC RX REV CODE- 258: Performed by: NURSE PRACTITIONER

## 2022-12-01 PROCEDURE — 83735 ASSAY OF MAGNESIUM: CPT

## 2022-12-01 PROCEDURE — 85025 COMPLETE CBC W/AUTO DIFF WBC: CPT

## 2022-12-01 RX ORDER — FLUTICASONE FUROATE, UMECLIDINIUM BROMIDE AND VILANTEROL TRIFENATATE 100; 62.5; 25 UG/1; UG/1; UG/1
1 POWDER RESPIRATORY (INHALATION) DAILY
COMMUNITY
Start: 2022-10-13

## 2022-12-01 RX ORDER — SODIUM CHLORIDE 9 MG/ML
200 INJECTION, SOLUTION INTRAVENOUS CONTINUOUS
Status: DISCONTINUED | OUTPATIENT
Start: 2022-12-01 | End: 2022-12-01

## 2022-12-01 RX ORDER — ALBUTEROL SULFATE 90 UG/1
2 AEROSOL, METERED RESPIRATORY (INHALATION)
COMMUNITY
Start: 2022-01-04

## 2022-12-01 RX ORDER — SODIUM CHLORIDE 9 MG/ML
1000 INJECTION, SOLUTION INTRAVENOUS CONTINUOUS
Status: DISCONTINUED | OUTPATIENT
Start: 2022-12-01 | End: 2022-12-03 | Stop reason: HOSPADM

## 2022-12-01 RX ORDER — IBUPROFEN 200 MG
200 TABLET ORAL
COMMUNITY

## 2022-12-01 RX ADMIN — SODIUM CHLORIDE 1000 ML: 9 INJECTION, SOLUTION INTRAVENOUS at 11:01

## 2022-12-01 NOTE — PROGRESS NOTES
IV cath inserted, and 0.9% NS started at 1100, patient shows no signs of adverse reactions, and tolerated IV placement well.  Vitals on initial assessment /71, T 97.4, P 67, R 18 O2 94 % on 2L of O2 via NC

## 2022-12-01 NOTE — PROGRESS NOTES
Infusion completed and d/c. Discharge home with daughter , no respiratory distress noted, non- productive cough noted. O2 @ 3 liter per minute. Romario Mcmullen